# Patient Record
Sex: FEMALE | Race: WHITE | NOT HISPANIC OR LATINO | Employment: FULL TIME | URBAN - METROPOLITAN AREA
[De-identification: names, ages, dates, MRNs, and addresses within clinical notes are randomized per-mention and may not be internally consistent; named-entity substitution may affect disease eponyms.]

---

## 2017-01-11 ENCOUNTER — ALLSCRIPTS OFFICE VISIT (OUTPATIENT)
Dept: OTHER | Facility: OTHER | Age: 32
End: 2017-01-11

## 2017-01-11 ENCOUNTER — TRANSCRIBE ORDERS (OUTPATIENT)
Dept: ADMINISTRATIVE | Facility: HOSPITAL | Age: 32
End: 2017-01-11

## 2017-01-11 ENCOUNTER — GENERIC CONVERSION - ENCOUNTER (OUTPATIENT)
Dept: OTHER | Facility: OTHER | Age: 32
End: 2017-01-11

## 2017-01-11 ENCOUNTER — HOSPITAL ENCOUNTER (OUTPATIENT)
Dept: ULTRASOUND IMAGING | Facility: HOSPITAL | Age: 32
Discharge: HOME | End: 2017-01-11
Attending: OBSTETRICS & GYNECOLOGY
Payer: COMMERCIAL

## 2017-01-11 DIAGNOSIS — O20.9 UNSPECIFIED HEMORRHAGE IN EARLY PREGNANCY, DELIVERED: Primary | ICD-10-CM

## 2017-01-11 DIAGNOSIS — N93.8 OTHER SPECIFIED ABNORMAL UTERINE AND VAGINAL BLEEDING: ICD-10-CM

## 2017-01-11 PROCEDURE — 76801 OB US < 14 WKS SINGLE FETUS: CPT

## 2017-01-12 ENCOUNTER — ALLSCRIPTS OFFICE VISIT (OUTPATIENT)
Dept: OTHER | Facility: OTHER | Age: 32
End: 2017-01-12

## 2017-01-12 LAB
ABO GROUP BLD: NORMAL
BLD GP AB SCN SERPL QL: NEGATIVE
HCG, QUANTITATIVE (HISTORICAL): 3580 MIU/ML
RH BLD: POSITIVE

## 2017-01-16 ENCOUNTER — GENERIC CONVERSION - ENCOUNTER (OUTPATIENT)
Dept: OTHER | Facility: OTHER | Age: 32
End: 2017-01-16

## 2017-01-17 ENCOUNTER — HOSPITAL ENCOUNTER (OUTPATIENT)
Dept: ULTRASOUND IMAGING | Facility: HOSPITAL | Age: 32
Discharge: HOME | End: 2017-01-17
Attending: OBSTETRICS & GYNECOLOGY
Payer: COMMERCIAL

## 2017-01-17 DIAGNOSIS — Z36.9 ENCOUNTER FOR ANTENATAL SCREENING OF MOTHER: ICD-10-CM

## 2017-01-17 PROCEDURE — 76816 OB US FOLLOW-UP PER FETUS: CPT

## 2017-01-17 PROCEDURE — 76817 TRANSVAGINAL US OBSTETRIC: CPT

## 2017-05-04 ENCOUNTER — ALLSCRIPTS OFFICE VISIT (OUTPATIENT)
Dept: OTHER | Facility: OTHER | Age: 32
End: 2017-05-04

## 2017-05-04 ENCOUNTER — LAB REQUISITION (OUTPATIENT)
Dept: LAB | Facility: HOSPITAL | Age: 32
End: 2017-05-04
Payer: COMMERCIAL

## 2017-05-04 DIAGNOSIS — N89.8 OTHER SPECIFIED NONINFLAMMATORY DISORDER OF VAGINA: ICD-10-CM

## 2017-05-04 DIAGNOSIS — Z01.419 ENCOUNTER FOR GYNECOLOGICAL EXAMINATION WITHOUT ABNORMAL FINDING: ICD-10-CM

## 2017-05-04 DIAGNOSIS — Z11.51 ENCOUNTER FOR SCREENING FOR HUMAN PAPILLOMAVIRUS (HPV): ICD-10-CM

## 2017-05-04 PROCEDURE — 87070 CULTURE OTHR SPECIMN AEROBIC: CPT | Performed by: OBSTETRICS & GYNECOLOGY

## 2017-05-04 PROCEDURE — 87591 N.GONORRHOEAE DNA AMP PROB: CPT | Performed by: OBSTETRICS & GYNECOLOGY

## 2017-05-04 PROCEDURE — 87491 CHLMYD TRACH DNA AMP PROBE: CPT | Performed by: OBSTETRICS & GYNECOLOGY

## 2017-05-04 PROCEDURE — 87624 HPV HI-RISK TYP POOLED RSLT: CPT | Performed by: OBSTETRICS & GYNECOLOGY

## 2017-05-04 PROCEDURE — G0145 SCR C/V CYTO,THINLAYER,RESCR: HCPCS | Performed by: OBSTETRICS & GYNECOLOGY

## 2017-05-04 PROCEDURE — 87147 CULTURE TYPE IMMUNOLOGIC: CPT | Performed by: OBSTETRICS & GYNECOLOGY

## 2017-05-07 LAB
BACTERIA GENITAL AEROBE CULT: NORMAL
BACTERIA GENITAL AEROBE CULT: NORMAL

## 2017-05-10 LAB
CHLAMYDIA DNA CVX QL NAA+PROBE: NORMAL
N GONORRHOEA DNA GENITAL QL NAA+PROBE: NORMAL

## 2017-05-11 ENCOUNTER — GENERIC CONVERSION - ENCOUNTER (OUTPATIENT)
Dept: OTHER | Facility: OTHER | Age: 32
End: 2017-05-11

## 2017-05-12 LAB — HPV RRNA GENITAL QL NAA+PROBE: ABNORMAL

## 2017-05-17 LAB
LAB AP GYN PRIMARY INTERPRETATION: NORMAL
Lab: NORMAL
PATH INTERP SPEC-IMP: NORMAL

## 2017-06-07 ENCOUNTER — LAB REQUISITION (OUTPATIENT)
Dept: LAB | Facility: HOSPITAL | Age: 32
End: 2017-06-07
Payer: COMMERCIAL

## 2017-06-07 ENCOUNTER — LAB CONVERSION - ENCOUNTER (OUTPATIENT)
Dept: OTHER | Facility: OTHER | Age: 32
End: 2017-06-07

## 2017-06-07 ENCOUNTER — ALLSCRIPTS OFFICE VISIT (OUTPATIENT)
Dept: OTHER | Facility: OTHER | Age: 32
End: 2017-06-07

## 2017-06-07 DIAGNOSIS — N76.0 ACUTE VAGINITIS: ICD-10-CM

## 2017-06-07 DIAGNOSIS — R87.810 CERVICAL HIGH RISK HUMAN PAPILLOMAVIRUS (HPV) DNA TEST POSITIVE: ICD-10-CM

## 2017-06-07 LAB — HCG, QUALITATIVE (HISTORICAL): NEGATIVE

## 2017-06-07 PROCEDURE — 88305 TISSUE EXAM BY PATHOLOGIST: CPT | Performed by: OBSTETRICS & GYNECOLOGY

## 2017-06-07 PROCEDURE — 87624 HPV HI-RISK TYP POOLED RSLT: CPT | Performed by: OBSTETRICS & GYNECOLOGY

## 2017-06-07 PROCEDURE — 87070 CULTURE OTHR SPECIMN AEROBIC: CPT | Performed by: OBSTETRICS & GYNECOLOGY

## 2017-06-07 PROCEDURE — 87623 HPV LOW-RISK TYPES: CPT | Performed by: OBSTETRICS & GYNECOLOGY

## 2017-06-09 LAB — BACTERIA GENITAL AEROBE CULT: NORMAL

## 2017-06-10 LAB
HPV I/H RISK 1 DNA CVX QL PROBE+SIG AMP: NEGATIVE
HPV LOW RISK DNA CVX QL PROBE+SIG AMP: NEGATIVE

## 2017-11-07 ENCOUNTER — ALLSCRIPTS OFFICE VISIT (OUTPATIENT)
Dept: OTHER | Facility: OTHER | Age: 32
End: 2017-11-07

## 2017-11-07 LAB — HCG, QUALITATIVE (HISTORICAL): NEGATIVE

## 2017-11-07 PROCEDURE — 87186 SC STD MICRODIL/AGAR DIL: CPT | Performed by: OBSTETRICS & GYNECOLOGY

## 2017-11-07 PROCEDURE — 87070 CULTURE OTHR SPECIMN AEROBIC: CPT | Performed by: OBSTETRICS & GYNECOLOGY

## 2017-11-07 PROCEDURE — 87077 CULTURE AEROBIC IDENTIFY: CPT | Performed by: OBSTETRICS & GYNECOLOGY

## 2017-11-08 NOTE — PROGRESS NOTES
Assessment  1  Bacterial vaginitis (616 10,041 9) (N76 0,B96 89)    Plan  Contraceptives    · Follow-up PRN Evaluation and Treatment  Follow-up  Status: Complete  Done:  56MGV9394 03:13PM   Ordered; For: Contraceptives; Ordered By: Citlaly Sparks Performed:  Due: 46ZIX9121  DUB (dysfunctional uterine bleeding)    · Urine HCG- POC; Status:Resulted - Requires Verification,Retrospective By Protocol  Authorization;   Done: 29MYC0034 02:54PM   Performed: In Office; EEA:[id number]; Last Updated By:EN Olivo; 11/7/2017 2:55:33 PM;Ordered; Today; For:DUB (dysfunctional uterine bleeding); Ordered By:Nakul Robles;    Discussion/Summary  Discussion Summary:   Will heed culture and urinalysis; gave 4 samples of LoLoestrin  Chief Complaint  Chief Complaint Free Text Note Form: The patient presents to the office for std testing  History of Present Illness  HPI: here for cultures to be taken to detect possible bacterial vaginitis; also wants to try a birth control      Review of Systems  Focused-Female:   Constitutional: No fever, no chills, feels well, no tiredness, no recent weight gain or loss  ENT: no ear ache, no loss of hearing, no nosebleeds or nasal discharge, no sore throat or hoarseness  Cardiovascular: no complaints of slow or fast heart rate, no chest pain, no palpitations, no leg claudication or lower extremity edema  Respiratory: no complaints of shortness of breath, no wheezing, no dyspnea on exertion, no orthopnea or PND  Breasts: no complaints of breast pain, breast lump or nipple discharge  Gastrointestinal: no complaints of abdominal pain, no constipation, no nausea or diarrhea, no vomiting, no bloody stools  Genitourinary: no complaints of dysuria, no incontinence, no pelvic pain, no dysmenorrhea, no vaginal discharge or abnormal vaginal bleeding  Musculoskeletal: no complaints of arthralgia, no myalgia, no joint swelling or stiffness, no limb pain or swelling     Integumentary: no complaints of skin rash or lesion, no itching or dry skin, no skin wounds  Neurological: no complaints of headache, no confusion, no numbness or tingling, no dizziness or fainting  Active Problems  1  Abnormal cervical Papanicolaou smear (795 00) (R87 619)   2  Amenorrhea (626 0) (N91 2)   3  Bacterial vaginitis (616 10,041 9) (N76 0,B96 89)   4  Contraceptives (V25 02)   5  DUB (dysfunctional uterine bleeding) (626 8) (N93 8)   6  Encounter for biopsy (V72 83) (Z01 818)   7  Encounter for cervical Pap smear with pelvic exam (V76 2,V72 31) (Z01 419)   8  Encounter for gynecological examination (V72 31) (Z01 419)   9  Encounter for pregnancy test (V72 40) (Z32 00)   10  Encounter for routine gynecological examination with Papanicolaou smear of cervix    (V72 31,V76 2) (Z01 419)   11  Encounter for routine gynecological examination with Papanicolaou smear of cervix    (V72 31,V76 2) (Z01 419)   12  Encounter for ultrasound to determine fetal location (V28 3) (Z36 89)   13  First trimester bleeding (640 93) (O20 9)   14  GBS (group B streptococcus) infection (041 02) (A49 1)   15  General counselling and advice on contraception (V25 09) (Z30 09)   16  Pap smear of cervix shows high risk HPV present (795 05) (R87 810)   17  Routine Gynecological Exam With Cervical Pap Smear (V72 31)   18  Routine screening for STI (sexually transmitted infection) (V74 5) (Z11 3)   19  Screening for HPV (human papillomavirus) (V73 81) (Z11 51)   20  Vaginal discharge (623 5) (N89 8)   21  Vaginal itching (698 1) (L29 8)   22  Vaginal odor (625 8) (N89 8)    Surgical History  1  History of Appendectomy (47 0)   2  History of Breast Surgery Reduction Procedure   3  Contraceptives (V25 02)    Family History  Mother    1  Family history of High blood pressure (401 9) (I10)  Father    2  Family history of High blood pressure (401 9) (I10)  Maternal Grandfather    3   Family history of Diabetes (250 00) (E11 9)    Social History   · Being A Social Drinker   · Never A Smoker    Current Meds   1  KlonoPIN TABS; Therapy: (0664 313 06 34) to Recorded   2  Pantoprazole Sodium 40 MG Oral Tablet Delayed Release; Therapy: 64ZUB4103 to (Evaluate:11Jan2015) Recorded   3  PROzac CAPS; Therapy: (0664 313 06 34) to Recorded   4  Xanax TABS; Therapy: (Recorded:07Nov2017) to Recorded    Allergies  1  No Known Drug Allergies    Vitals  Vital Signs    Recorded: 51EWL1453 39:14CQ   Systolic 494   Diastolic 72   Height 5 ft 2 5 in   Weight 166 lb    BMI Calculated 29 88   BSA Calculated 1 78   LMP 22Oct2017     Physical Exam    Genitourinary   Vagina: Normal, no lesions or dryness appreciated  -- culture        Results/Data  Urine HCG- POC 69UCW7554 02:54PM [facility]     Test Name Result Flag Reference   Urine HCG Negative                     Signatures   Electronically signed by : Ede Ramirez DO; Nov 7 2017  3:15PM EST                       (Author)

## 2017-11-09 ENCOUNTER — LAB REQUISITION (OUTPATIENT)
Dept: LAB | Facility: HOSPITAL | Age: 32
End: 2017-11-09
Payer: COMMERCIAL

## 2017-11-09 DIAGNOSIS — Z11.3 ENCOUNTER FOR SCREENING FOR INFECTIONS WITH PREDOMINANTLY SEXUAL MODE OF TRANSMISSION: ICD-10-CM

## 2017-11-09 DIAGNOSIS — N89.8 OTHER SPECIFIED NONINFLAMMATORY DISORDERS OF VAGINA (CODE): ICD-10-CM

## 2017-11-09 PROCEDURE — 87591 N.GONORRHOEAE DNA AMP PROB: CPT | Performed by: OBSTETRICS & GYNECOLOGY

## 2017-11-09 PROCEDURE — 87491 CHLMYD TRACH DNA AMP PROBE: CPT | Performed by: OBSTETRICS & GYNECOLOGY

## 2017-11-10 LAB
CHLAMYDIA DNA CVX QL NAA+PROBE: NORMAL
N GONORRHOEA DNA GENITAL QL NAA+PROBE: NORMAL

## 2017-11-11 LAB — BACTERIA GENITAL AEROBE CULT: ABNORMAL

## 2017-11-14 ENCOUNTER — GENERIC CONVERSION - ENCOUNTER (OUTPATIENT)
Dept: OTHER | Facility: OTHER | Age: 32
End: 2017-11-14

## 2017-12-13 ENCOUNTER — HOSPITAL ENCOUNTER (EMERGENCY)
Facility: HOSPITAL | Age: 32
Discharge: HOME | End: 2017-12-13
Attending: EMERGENCY MEDICINE | Admitting: EMERGENCY MEDICINE
Payer: COMMERCIAL

## 2017-12-13 VITALS
RESPIRATION RATE: 18 BRPM | OXYGEN SATURATION: 98 % | SYSTOLIC BLOOD PRESSURE: 185 MMHG | DIASTOLIC BLOOD PRESSURE: 86 MMHG | HEART RATE: 93 BPM

## 2017-12-13 DIAGNOSIS — B37.3 VAGINAL CANDIDIASIS: Primary | ICD-10-CM

## 2017-12-13 LAB — EXT PREG TEST URINE: NEGATIVE

## 2017-12-13 PROCEDURE — 87491 CHLMYD TRACH DNA AMP PROBE: CPT | Performed by: EMERGENCY MEDICINE

## 2017-12-13 PROCEDURE — 99283 EMERGENCY DEPT VISIT LOW MDM: CPT

## 2017-12-13 PROCEDURE — 81025 URINE PREGNANCY TEST: CPT | Performed by: EMERGENCY MEDICINE

## 2017-12-13 PROCEDURE — 87591 N.GONORRHOEAE DNA AMP PROB: CPT | Performed by: EMERGENCY MEDICINE

## 2017-12-13 PROCEDURE — 96372 THER/PROPH/DIAG INJ SC/IM: CPT

## 2017-12-13 RX ORDER — AZITHROMYCIN 250 MG/1
1000 TABLET, FILM COATED ORAL ONCE
Status: COMPLETED | OUTPATIENT
Start: 2017-12-13 | End: 2017-12-13

## 2017-12-13 RX ORDER — FLUCONAZOLE 150 MG/1
150 TABLET ORAL ONCE
Qty: 1 TABLET | Refills: 0 | Status: SHIPPED | OUTPATIENT
Start: 2017-12-16 | End: 2017-12-16

## 2017-12-13 RX ORDER — FLUCONAZOLE 150 MG/1
150 TABLET ORAL ONCE
Status: COMPLETED | OUTPATIENT
Start: 2017-12-13 | End: 2017-12-13

## 2017-12-13 RX ADMIN — LIDOCAINE HYDROCHLORIDE 250 MG: 10 INJECTION, SOLUTION EPIDURAL; INFILTRATION; INTRACAUDAL; PERINEURAL at 18:47

## 2017-12-13 RX ADMIN — FLUCONAZOLE 150 MG: 150 TABLET ORAL at 18:45

## 2017-12-13 RX ADMIN — AZITHROMYCIN 1000 MG: 250 TABLET, FILM COATED ORAL at 18:46

## 2017-12-13 NOTE — DISCHARGE INSTRUCTIONS
Vulvovaginal Candidiasis   WHAT YOU NEED TO KNOW:   Vulvovaginal candidiasis, or yeast infection, is a common vaginal infection  Vulvovaginal candidiasis is caused by a fungus, or yeast-like germ  Fungi are normally found in your vagina  When there are too many fungi, it can cause an infection  DISCHARGE INSTRUCTIONS:   Return to the emergency department if:   · You have fever and chills  · You are bleeding from your vagina and it is not your monthly period  · You develop abdominal or pelvic pain  Contact your healthcare provider if:   · Your signs and symptoms get worse, even after treatment  · You have questions or concerns about your condition or care  Medicines:   · Medicines  help treat the fungal infection and decrease inflammation  The medicine may be a pill, topical cream, or vaginal suppository  · Take your medicine as directed  Contact your healthcare provider if you think your medicine is not helping or if you have side effects  Tell him of her if you are allergic to any medicine  Keep a list of the medicines, vitamins, and herbs you take  Include the amounts, and when and why you take them  Bring the list or the pill bottles to follow-up visits  Carry your medicine list with you in case of an emergency  Manage your symptoms:   · Wear cotton underwear  · Keep the vaginal area clean and dry  · Do not have sex until your symptoms are gone  · Do not douche  · Do not use feminine hygiene sprays, powders, or bubble bath  Prevent another infection:   · Take showers instead of baths  · Eat yogurt  · Limit the amount of alcohol you drink'    · Control your blood sugar if you are diabetic  · Limit your time in hot tubs  Follow up with your healthcare provider as directed:  Write down your questions so you remember to ask them during your visits     © 2017 [employer] Information is for End User's use only and may not be sold, redistributed or otherwise used for commercial purposes  All illustrations and images included in CareNotes® are the copyrighted property of [employer]  or Dyllan Ford  The above information is an  only  It is not intended as medical advice for individual conditions or treatments  Talk to your doctor, nurse or pharmacist before following any medical regimen to see if it is safe and effective for you  Safe Sex   WHAT YOU NEED TO KNOW:   Safe sex is a combination of practices you can do to prevent pregnancy and the spread of sexually transmitted infections (STIs)  These practices help to decrease or prevent the exchange of body fluids during sexual contact  Body fluids include saliva, urine, blood, vaginal fluids, and semen  All types of sex can cause STIs  This includes oral, vaginal, and anal sex  DISCHARGE INSTRUCTIONS:   Return to the emergency department if:   · A condom breaks, leaks, or slips off while you are having sex  · You notice sores on your penis, vagina, anal area, or skin around them  · You have had unsafe sex and want to discuss emergency contraception or treatment for STI exposure  Contact your healthcare provider if:   · You think you might be pregnant  · You have questions or concerns about your condition or care  How to practice safe sex:  Talk to your partner before you have sex  Ask about his or her sexual history and any current or past STI  · Use condoms and barrier methods for all types of sexual contact  Use a new condom or latex barrier each time you have sex  This includes oral, vaginal, and anal sex  Make sure that the condom fits and is put on correctly  Rubber latex sheets or dental dams can be used for oral sex  Ask your healthcare provider how to use these items and where to purchase them  If you are allergic to latex, use a nonlatex product such as polyurethane  · Limit your number of sexual partners  More than one sex partner can increase your risk for an STI  Do not have sex with anyone whose sexual history you do not know  · Do not do activities that can pass germs  Do not use saliva as a lubricant or share sex toys  · Tell your sex partner if you have an STI  Your partner may need to be tested and treated  Do not have sex while you are being treated for an STI, or with a partner who is being treated  · Get tested regularly for STIs  Get tested if you have had sexual contact with someone who has an STI  Get tested if you have unprotected sex with any new partner  · Get vaccinated  Vaccines may help to lower your risk for an STI such as HPV, hepatitis A, or hepatitis B  Ask your healthcare provider for more information on vaccines  Other ways to practice safe sex:   · Only use water-based lubricants during sex  Water-based lubricants may prevent sores or cuts in the vagina or penis  Prevent sores or cuts to decrease your risk for an STI  Do not use oil-based lubricants, such as baby oil or hand lotion, with latex condoms or barriers  These will weaken the latex and may cause it to break  · Do not use chemical irritants on condoms or genitals  Products that contain chemical irritants, such as spermicides, can irritate the lining of your vagina or rectum  Irritation may cause sores that may increase your risk for an STI  · Be careful when you have sex if you have open sores or cuts  Open sores or cuts may increase your risk for an STI  This includes new piercings and tattoos  Keep all open sores or cuts covered during sex  Do not have oral sex if you have cuts or sores in your mouth  Ask your healthcare provider when it is safe to have sex after you get a tattoo or piercing  · Do not use alcohol or drugs before sex  These substances can prevent you from thinking clearly and increase your risk for unsafe sex  Follow up with your healthcare provider as directed:  Write down your questions so you remember to ask them during your visits  © 2017 [facility] Information is for End User's use only and may not be sold, redistributed or otherwise used for commercial purposes  All illustrations and images included in CareNotes® are the copyrighted property of [employer] Inc  or Dyllan Ford  The above information is an  only  It is not intended as medical advice for individual conditions or treatments  Talk to your doctor, nurse or pharmacist before following any medical regimen to see if it is safe and effective for you

## 2017-12-13 NOTE — ED PROVIDER NOTES
History  Chief Complaint   Patient presents with    Vaginal Discharge     Pt having white discharge from vagina, vaginal itching/pain, fevers (TMAX 101 3 temporal) and abd pain, pt reports her  was cheating on her  Pt has been self treating with monistatx2 days without relief  Pt had miscarriage in january  Patient is a 20-year-old female  She presents to the emergency room for evaluation of a vaginal discharge  She initially thought it was used  She tried treating it with Monistat without relief  She does have associated lower abdominal pain  She reports a fever  No dysuria or frequency  No hematuria  No constipation or diarrhea  No nausea or vomiting  She recently left her  for him cheating on her  She is worried she might have been exposed to something  She has also been having protected sex with condoms with multiple partners  Symptoms are moderate in intensity  There are no relieving factors  None       Past Medical History:   Diagnosis Date    GERD (gastroesophageal reflux disease)     Miscarriage        Past Surgical History:   Procedure Laterality Date    ABDOMINAL SURGERY      GASTRIC RESTRICTION SURGERY         History reviewed  No pertinent family history  I have reviewed and agree with the history as documented  Social History   Substance Use Topics    Smoking status: Never Smoker    Smokeless tobacco: Never Used    Alcohol use No        Review of Systems   Constitutional: Positive for fever  Negative for chills  HENT: Negative for rhinorrhea and sore throat  Eyes: Negative for pain, redness and visual disturbance  Respiratory: Negative for cough and shortness of breath  Cardiovascular: Negative for chest pain and leg swelling  Gastrointestinal: Positive for abdominal pain  Negative for diarrhea and vomiting  Endocrine: Negative for polydipsia and polyuria  Genitourinary: Positive for vaginal discharge   Negative for dysuria, frequency, hematuria and vaginal bleeding  Musculoskeletal: Negative for back pain and neck pain  Skin: Negative for rash and wound  Allergic/Immunologic: Negative for immunocompromised state  Neurological: Negative for weakness, numbness and headaches  Hematological: Does not bruise/bleed easily  Psychiatric/Behavioral: Negative for hallucinations and suicidal ideas  All other systems reviewed and are negative  Physical Exam  ED Triage Vitals [12/13/17 1536]   Temp Pulse Respirations Blood Pressure SpO2   -- 61 18 (!) 193/80 100 %      Temp src Heart Rate Source Patient Position - Orthostatic VS BP Location FiO2 (%)   -- Monitor Sitting Left arm --      Pain Score       6           Orthostatic Vital Signs  Vitals:    12/13/17 1536 12/13/17 1754   BP: (!) 193/80 (!) 185/86   Pulse: 61 93   Patient Position - Orthostatic VS: Sitting Sitting       Physical Exam   Constitutional: She is oriented to person, place, and time  She appears well-developed and well-nourished  HENT:   Head: Normocephalic and atraumatic  Mouth/Throat: Oropharynx is clear and moist    Eyes: Conjunctivae are normal  Right eye exhibits no discharge  Left eye exhibits no discharge  No scleral icterus  Neck: Normal range of motion  Neck supple  Cardiovascular: Normal rate, regular rhythm, normal heart sounds and intact distal pulses  Exam reveals no gallop and no friction rub  No murmur heard  Pulmonary/Chest: Effort normal and breath sounds normal  No stridor  No respiratory distress  She has no wheezes  She has no rales  Abdominal: Soft  Bowel sounds are normal  She exhibits no distension  There is no tenderness  There is no rebound and no guarding  Genitourinary:   Genitourinary Comments: There are no vaginal lesions  There is a clumpy white vaginal discharge  There is no cervical motion tenderness  No adnexal tenderness  Musculoskeletal: Normal range of motion  She exhibits no edema, tenderness or deformity     No CVA tenderness  No calf tenderness/palpable cords  Neurological: She is alert and oriented to person, place, and time  She has normal strength  No sensory deficit  GCS eye subscore is 4  GCS verbal subscore is 5  GCS motor subscore is 6  Skin: Skin is warm and dry  No rash noted  Psychiatric: She has a normal mood and affect  Her behavior is normal    Vitals reviewed  ED Medications  Medications   cefTRIAXone (ROCEPHIN) 250 mg in lidocaine (PF) (XYLOCAINE-MPF) 1 % IM only syringe (not administered)   azithromycin (ZITHROMAX) tablet 1,000 mg (not administered)   fluconazole (DIFLUCAN) tablet 150 mg (not administered)       Diagnostic Studies  Results Reviewed     Procedure Component Value Units Date/Time    POCT pregnancy, urine [[id number]]  (Normal) Resulted:  12/13/17 1759    Lab Status:  Final result Updated:  12/13/17 1759     EXT PREG TEST UR (Ref: Negative) Negative                 No orders to display              Procedures  Procedures       Phone Contacts  ED Phone Contact    ED Course  ED Course                                MDM  Number of Diagnoses or Management Options  Diagnosis management comments: Most likely this is candidiasis  PID is less likely  Amount and/or Complexity of Data Reviewed  Clinical lab tests: ordered and reviewed      CritCare Time    Disposition  Final diagnoses:   Vaginal candidiasis     Time reflects when diagnosis was documented in both MDM as applicable and the Disposition within this note     Time User Action Codes Description Comment    12/13/2017  6:28 PM Mario Rondon Add [B37 3] Vaginal candidiasis       ED Disposition     ED Disposition Condition Comment    Discharge  [address] discharge to home/self care  Condition at discharge: Good        Follow-up Information     Follow up With Specialties Details Why Contact Info       Follow-up with your OBGYN if not better in 1 week           Patient's Medications   Discharge Prescriptions FLUCONAZOLE (DIFLUCAN) 150 MG TABLET    Take 1 tablet by mouth once for 1 dose       Start Date: 12/16/2017End Date: 12/16/2017       Order Dose: 150 mg       Quantity: 1 tablet    Refills: 0     No discharge procedures on file      ED Provider  Electronically Signed by           Mine Pitts MD  12/13/17 8693

## 2017-12-14 LAB
CHLAMYDIA DNA CVX QL NAA+PROBE: NORMAL
N GONORRHOEA DNA GENITAL QL NAA+PROBE: NORMAL

## 2018-01-10 NOTE — CONSULTS
Chief Complaint  The patient presents to the office for std testing  History of Present Illness  here for cultures to be taken to detect possible bacterial vaginitis; also wants to try a birth control      Review of Systems    Constitutional: No fever, no chills, feels well, no tiredness, no recent weight gain or loss  ENT: no ear ache, no loss of hearing, no nosebleeds or nasal discharge, no sore throat or hoarseness  Cardiovascular: no complaints of slow or fast heart rate, no chest pain, no palpitations, no leg claudication or lower extremity edema  Respiratory: no complaints of shortness of breath, no wheezing, no dyspnea on exertion, no orthopnea or PND  Breasts: no complaints of breast pain, breast lump or nipple discharge  Gastrointestinal: no complaints of abdominal pain, no constipation, no nausea or diarrhea, no vomiting, no bloody stools  Genitourinary: no complaints of dysuria, no incontinence, no pelvic pain, no dysmenorrhea, no vaginal discharge or abnormal vaginal bleeding  Musculoskeletal: no complaints of arthralgia, no myalgia, no joint swelling or stiffness, no limb pain or swelling  Integumentary: no complaints of skin rash or lesion, no itching or dry skin, no skin wounds  Neurological: no complaints of headache, no confusion, no numbness or tingling, no dizziness or fainting  Active Problems    1  Abnormal cervical Papanicolaou smear (795 00) (R87 619)   2  Amenorrhea (626 0) (N91 2)   3  Bacterial vaginitis (616 10,041 9) (N76 0,B96 89)   4  Contraceptives (V25 02)   5  DUB (dysfunctional uterine bleeding) (626 8) (N93 8)   6  Encounter for biopsy (V72 83) (Z01 818)   7  Encounter for cervical Pap smear with pelvic exam (V76 2,V72 31) (Z01 419)   8  Encounter for gynecological examination (V72 31) (Z01 419)   9  Encounter for pregnancy test (V72 40) (Z32 00)   10   Encounter for routine gynecological examination with Papanicolaou smear of cervix    (V72 31,V76 2) (Z01 419)   11  Encounter for routine gynecological examination with Papanicolaou smear of cervix    (V72 31,V76 2) (Z01 419)   12  Encounter for ultrasound to determine fetal location (V28 3) (Z36 89)   13  First trimester bleeding (640 93) (O20 9)   14  GBS (group B streptococcus) infection (041 02) (A49 1)   15  General counselling and advice on contraception (V25 09) (Z30 09)   16  Pap smear of cervix shows high risk HPV present (795 05) (R87 810)   17  Routine Gynecological Exam With Cervical Pap Smear (V72 31)   18  Routine screening for STI (sexually transmitted infection) (V74 5) (Z11 3)   19  Screening for HPV (human papillomavirus) (V73 81) (Z11 51)   20  Vaginal discharge (623 5) (N89 8)   21  Vaginal itching (698 1) (L29 8)   22  Vaginal odor (625 8) (N89 8)    Surgical History    · History of Appendectomy (47 0)   · History of Breast Surgery Reduction Procedure   · Contraceptives (V25 02)    Family History    · Family history of High blood pressure (401 9) (I10)    · Family history of High blood pressure (401 9) (I10)    · Family history of Diabetes (250 00) (E11 9)    Social History    · Being A Social Drinker   · Never A Smoker    Current Meds   1  KlonoPIN TABS; Therapy: (0664 313 06 34) to Recorded   2  Pantoprazole Sodium 40 MG Oral Tablet Delayed Release; Therapy: 14JDW6051 to (Evaluate:11Jan2015) Recorded   3  PROzac CAPS; Therapy: (0664 313 06 34) to Recorded   4  Xanax TABS; Therapy: (Recorded:07Nov2017) to Recorded    Allergies    1  No Known Drug Allergies    Vitals   Recorded: 21WFB9212 49:46OD   Systolic 997   Diastolic 72   Height 5 ft 2 5 in   Weight 166 lb    BMI Calculated 29 88   BSA Calculated 1 78   LMP 22Oct2017     Physical Exam    Genitourinary   Vagina: Normal, no lesions or dryness appreciated  culture  Results/Data  Urine HCG- POC 29RAE4789 02:54PM [facility]     Test Name Result Flag Reference   Urine HCG Negative                     Assessment    1  Bacterial vaginitis (616 10,041 9) (N76 0,B96 89)    Plan  Contraceptives    · Follow-up PRN Evaluation and Treatment  Follow-up  Status: Complete  Done:  09XEN1559 03:13PM   Ordered; For: Contraceptives; Ordered By: Catherine Wells Performed:  Due: 83QUJ0924  DUB (dysfunctional uterine bleeding)    · Urine HCG- POC; Status:Resulted - Requires Verification,Retrospective By Protocol  Authorization;   Done: 42KJU9728 02:54PM   Performed: In Office; ELEUTERIO:[id number]; Last Updated By:Francisco Olivo; 11/7/2017 2:55:33 PM;Ordered; Today; For:DUB (dysfunctional uterine bleeding); Ordered By:Nakul Robles;    Discussion/Summary    Will heed culture and urinalysis; gave 4 samples of LoLoestrin        Signatures   Electronically signed by : Navdeep Singh DO; Nov 7 2017  3:15PM EST                       (Author)

## 2018-01-10 NOTE — PROGRESS NOTES
Chief Complaint  pt presents for hcg draw      Active Problems    1  Amenorrhea (626 0) (N91 2)   2  Contraceptives (V25 02)   3  Encounter for pregnancy test (V72 40) (Z32 00)   4  Encounter for routine gynecological examination with Papanicolaou smear of cervix   (V72 31,V76 2) (Z01 419)   5  Encounter for routine gynecological examination with Papanicolaou smear of cervix   (V72 31,V76 2) (Z01 419)   6  General counselling and advice on contraception (V25 09) (Z30 09)   7  Routine Gynecological Exam With Cervical Pap Smear (V72 31)   8  Routine screening for STI (sexually transmitted infection) (V74 5) (Z11 3)    Current Meds   1  ALPRAZolam 0 25 MG Oral Tablet; Therapy: 00Vgd5911 to (Evaluate:02Nov2014) Recorded   2  Flonase 50 MCG/ACT SUSP; USE AS DIRECTED Recorded   3  Lexapro TABS; Take 1 tablet daily Recorded   4  NuvaRing 0 12-0 015 MG/24HR Vaginal Ring; INSERT 1 RING VAGINALLY EVERY 3   Weeks; Therapy: 94APS1984 to (Evaluate:16Nov2016)  Requested for: 87UOD4446; Last   Rx:76Uic1443 Ordered   5  NuvaRing 0 12-0 015 MG/24HR Vaginal Ring; USE AS DIRECTED Recorded   6  Pantoprazole Sodium 40 MG Oral Tablet Delayed Release; Therapy: 69HWJ1588 to (Evaluate:11Jan2015) Recorded   7  Singulair 10 MG Oral Tablet; Therapy: 88EUD4176 to (Last KA:11RCN6792)  Requested for: 08JKS4759 Ordered   8  Spironolactone TABS; Take 1 tablet twice daily Recorded    Allergies    1  No Known Drug Allergies    Plan  Amenorrhea    · (1) HCG QUANT; Status:Active - Retrospective By Protocol Authorization;  Requested  [facility]:[id number];     Future Appointments    Date/Time Provider Specialty Site   12/19/2016 04:10 PM Wellington Lanes, DO [facility]stetrics[facility]necology [facility] [address]     Signatures   Electronically signed by : Zohra Sequeira DO; Oct 25 2016  5:43PM EST                       (Author)

## 2018-01-10 NOTE — MISCELLANEOUS
Message  Return to work or school:   David Baldwin is under my professional care  She was seen in my office on 1/11/2017   She is able to return to work on  1/12/2017            Signatures   Electronically signed by :  JOSE LUIS Akins ; Jan 11 2017  4:26PM EST                       (Author)

## 2018-01-11 NOTE — PROGRESS NOTES
Chief Complaint  pt presents for a repeat HCG/ T&S      Active Problems    1  Amenorrhea (626 0) (N91 2)   2  Contraceptives (V25 02)   3  DUB (dysfunctional uterine bleeding) (626 8) (N93 8)   4  Encounter for gynecological examination (V72 31) (Z01 419)   5  Encounter for pregnancy test (V72 40) (Z32 00)   6  Encounter for routine gynecological examination with Papanicolaou smear of cervix   (V72 31,V76 2) (Z01 419)   7  Encounter for routine gynecological examination with Papanicolaou smear of cervix   (V72 31,V76 2) (Z01 419)   8  First trimester bleeding (640 93) (O20 9)   9  General counselling and advice on contraception (V25 09) (Z30 09)   10  Routine Gynecological Exam With Cervical Pap Smear (V72 31)   11  Routine screening for STI (sexually transmitted infection) (V74 5) (Z11 3)    Current Meds   1  ClomiPHENE Citrate 50 MG Oral Tablet; One tablet daily day five through day nine; Therapy: 60Ozf2656 to (Evaluate:10Jan2017)  Requested for: 78Ird3760; Last   Rx:[id number] Ordered   2  Flonase 50 MCG/ACT SUSP; USE AS DIRECTED Recorded   3  Lexapro TABS; Take 1 tablet daily Recorded   4  MedroxyPROGESTERone Acetate 10 MG Oral Tablet; One tablet daily day 16 to 25 of   cycle; Therapy: 07Puq7130 to (Evaluate:30Jan2017)  Requested for: 31Nwz3198; Last   Rx:[id number] Ordered   5  Pantoprazole Sodium 40 MG Oral Tablet Delayed Release; Therapy: 03JSU7172 to (Evaluate:11Jan2015) Recorded    Allergies    1  No Known Drug Allergies    Plan  First trimester bleeding    · (1) HCG QUANT; Status:Active - Retrospective By Protocol Authorization; Requested  VVW:[id number];    · (1) TYPE & SCREEN; Status:Active - Retrospective By Protocol Authorization;  Requested  WET:[id number];   the patient pregnant or have they been in the last 90 days? : Yes  the patient been transfused in the last 3 months? : No  number of units for surgical date : 0  of Surgery : [id number]    Future Appointments    Date/Time Provider Specialty Site   04/17/2017 03:30 PM Sammi Becerra DO Obstetrics/Gynecology [facility]     Signatures   Electronically signed by :  JOSE LUIS Akins ; Jan 11 2017  4:27PM EST                       (Author)

## 2018-01-11 NOTE — MISCELLANEOUS
Message  Message Free Text Note Form: I called and spoke with pt  about culture and sent Rx to [facility] in [place] for ciprofloxacin      Plan    1   Ciprofloxacin HCl - 500 MG Oral Tablet; TAKE 1 TABLET TWICE DAILY    Signatures   Electronically signed by : Wagner Cid DO; Nov 14 2017  6:50PM EST                       (Author)

## 2018-01-12 NOTE — MISCELLANEOUS
Message  Message Free Text Note Form: called and spoke to pt  about tissue report and bacterial, viral cultures      Signatures   Electronically signed by : Leata Lennox, DO; Jun 13 2017  5:16PM EST                       (Author)

## 2018-01-13 VITALS
DIASTOLIC BLOOD PRESSURE: 72 MMHG | WEIGHT: 166 LBS | SYSTOLIC BLOOD PRESSURE: 122 MMHG | HEIGHT: 63 IN | BODY MASS INDEX: 29.41 KG/M2

## 2018-01-13 VITALS
WEIGHT: 156.5 LBS | SYSTOLIC BLOOD PRESSURE: 120 MMHG | HEIGHT: 63 IN | BODY MASS INDEX: 27.73 KG/M2 | DIASTOLIC BLOOD PRESSURE: 70 MMHG

## 2018-01-13 VITALS — BODY MASS INDEX: 28.53 KG/M2 | DIASTOLIC BLOOD PRESSURE: 70 MMHG | SYSTOLIC BLOOD PRESSURE: 128 MMHG | WEIGHT: 158.5 LBS

## 2018-01-13 NOTE — MISCELLANEOUS
Message  Message Free Text Note Form: left message about recent cultures      Signatures   Electronically signed by : Aria Gardiner DO; May 11 2017  1:07PM EST                       (Author)

## 2018-01-16 NOTE — MISCELLANEOUS
Message  Message Free Text Note Form: I called and left message about neg   HCG results      Signatures   Electronically signed by : Mary Krishnan DO; Oct 27 2016  5:51PM EST                       (Author)

## 2018-01-17 NOTE — MISCELLANEOUS
Message  Message Free Text Note Form: spoke with pt  after her request to review labs of 01/04/2017; rec: with visit for blood work, to see nurse educator and make 1st doctor apt  with Dr Thee Last or Dr Ashlee Ortiz where they may be able to do u s  since quantitative value is > 3000; (note: I was part of Tonya's high school project as her health mentor; she, her sister, and Mother all patients, but her sister moved to [place] and just had her first baby prior to Izabella, 2016)      Signatures   Electronically signed by : Lauro Weeks DO; Rubio 10 2017  6:01PM EST                       (Author)

## 2018-01-17 NOTE — MISCELLANEOUS
Message  Message Free Text Note Form: I called and spoke with pt  about today's f/u pelvic u s ; I advised her to save anything that she may notice that passes in a pas or the toilet and bring it here for confirmation ;l will need f/u apt  here; blood type is B Positive; the report did not become available until late this afternoon; I offered our condolences and specifically mentioned that compared to 01/11/2017 , NO heartbeat today and even the measurement of the u s  is less      Signatures   Electronically signed by : Edvin Barrett DO; Jan 17 2017  5:26PM EST                       (Author)

## 2018-03-22 ENCOUNTER — TELEPHONE (OUTPATIENT)
Dept: OBGYN CLINIC | Facility: MEDICAL CENTER | Age: 33
End: 2018-03-22

## 2018-03-22 NOTE — TELEPHONE ENCOUNTER
The patient called in stating that she was having issues with an odor and thick white discharge and some itching and burning as well  I spoke with Dr James Garcia and he said to call in a prescription for Fluconazole 200 mg #2 she takes one pill today and then the next pill in two days with 1 refill  She is to call if this does not work to make an appt  I then relayed this to the patient and she was ok with this

## 2018-11-21 ENCOUNTER — OFFICE VISIT (OUTPATIENT)
Dept: OBGYN CLINIC | Facility: MEDICAL CENTER | Age: 33
End: 2018-11-21
Payer: COMMERCIAL

## 2018-11-21 VITALS
SYSTOLIC BLOOD PRESSURE: 116 MMHG | HEIGHT: 62 IN | WEIGHT: 159 LBS | DIASTOLIC BLOOD PRESSURE: 76 MMHG | BODY MASS INDEX: 29.26 KG/M2

## 2018-11-21 DIAGNOSIS — N89.8 VAGINAL IRRITATION: Primary | ICD-10-CM

## 2018-11-21 DIAGNOSIS — R10.2 PELVIC PAIN IN FEMALE: ICD-10-CM

## 2018-11-21 PROBLEM — R87.619 ABNORMAL CERVICAL PAPANICOLAOU SMEAR: Status: ACTIVE | Noted: 2017-06-07

## 2018-11-21 PROBLEM — O20.9 FIRST TRIMESTER BLEEDING: Status: ACTIVE | Noted: 2017-01-11

## 2018-11-21 PROBLEM — A49.1 GBS (GROUP B STREPTOCOCCUS) INFECTION: Status: ACTIVE | Noted: 2017-05-08

## 2018-11-21 PROBLEM — R87.810 PAP SMEAR OF CERVIX SHOWS HIGH RISK HPV PRESENT: Status: ACTIVE | Noted: 2017-06-07

## 2018-11-21 PROCEDURE — 99214 OFFICE O/P EST MOD 30 MIN: CPT | Performed by: OBSTETRICS & GYNECOLOGY

## 2018-11-21 PROCEDURE — 87070 CULTURE OTHR SPECIMN AEROBIC: CPT | Performed by: OBSTETRICS & GYNECOLOGY

## 2018-11-21 NOTE — PROGRESS NOTES
Assessment:   Anup Mendoza was seen today for vaginitis  Diagnoses and all orders for this visit:    Vaginal irritation  -     Genital Comprehensive Culture    Pelvic pain in female  -     US pelvis complete w transvaginal; Future    Discussion/Summary:  Here for recheck of bacterial vaginitis and also she was concerned about lower pelvic pain; vaginal culture obtained, wet mount was negative; time spent was  At least 25 minutes with greater than 50 % counseling; will heed u s  And have pt  RTO to weeks to discuss results and she may want to resume a contraceptive  Plan:  Heed culture and pelvic u s   1   Wet prep was obtained  Cultures for gonorrhea and chlamydia were collected  Affirm was not obtained  2   Will contact pt with results  3  Patient was not treated   CHIEF COMPLAINT: vaginal discharge and irritation and lower pelvic pain    Subjective:   Jitendra Cunha is a 29 y o  yo female who presents for evaluation and reculture after being treated for bacterial vaginosis  Her symptoms started 1 month ago  Alleviating factors: metronidazole  Aggravating factors: coitus    ROS:   She denies hematuria, dysuria, constipation, diarrhea, fevers, chills, nausea or emesis  Patient Active Problem List   Diagnosis    Abnormal cervical Papanicolaou smear    Amenorrhea    Depressive disorder    DUB (dysfunctional uterine bleeding)    First trimester bleeding    GBS (group B streptococcus) infection    Pap smear of cervix shows high risk HPV present       Past Medical History:   Diagnosis Date    GERD (gastroesophageal reflux disease)     Miscarriage        Social History     Social History    Marital status: Legally      Spouse name: N/A    Number of children: N/A    Years of education: N/A     Occupational History    Not on file       Social History Main Topics    Smoking status: Never Smoker    Smokeless tobacco: Never Used    Alcohol use No    Drug use: No    Sexual activity: Yes     Birth control/ protection: Condom     Other Topics Concern    Not on file     Social History Narrative    No narrative on file       No current outpatient prescriptions on file  Allergies   Allergen Reactions    Latex Facial Swelling       /76   Ht 5' 2" (1 575 m)   Wt 72 1 kg (159 lb)   LMP 11/11/2018   Breastfeeding? No   BMI 29 08 kg/m²     GEN: The patient was alert and oriented x3, pleasant well-appearing female in no acute distress  Pelvic: Normal appearing external female genitalia, normal vaginal epithelium, normalappearing cervix  positive discharge noted  Wet Prep: Clue cells negative, Hyphae negative, Trichomonas negative  Whiff Test was performed   which was negative

## 2018-11-23 LAB — BACTERIA GENITAL AEROBE CULT: NORMAL

## 2018-11-24 ENCOUNTER — TELEPHONE (OUTPATIENT)
Dept: OBGYN CLINIC | Facility: MEDICAL CENTER | Age: 33
End: 2018-11-24

## 2019-11-27 ENCOUNTER — CLINICAL SUPPORT (OUTPATIENT)
Dept: OBGYN CLINIC | Facility: MEDICAL CENTER | Age: 34
End: 2019-11-27
Payer: COMMERCIAL

## 2019-11-27 DIAGNOSIS — Z32.01 POSITIVE URINE PREGNANCY TEST: Primary | ICD-10-CM

## 2019-11-27 LAB
ABO GROUP BLD: NORMAL
B-HCG SERPL-ACNC: 262 MIU/ML
BLD GP AB SCN SERPL QL: NEGATIVE
PROGEST SERPL-MCNC: 13.6 NG/ML
RH BLD: POSITIVE
SPECIMEN EXPIRATION DATE: NORMAL

## 2019-11-27 PROCEDURE — 86900 BLOOD TYPING SEROLOGIC ABO: CPT | Performed by: OBSTETRICS & GYNECOLOGY

## 2019-11-27 PROCEDURE — 84702 CHORIONIC GONADOTROPIN TEST: CPT | Performed by: OBSTETRICS & GYNECOLOGY

## 2019-11-27 PROCEDURE — 86901 BLOOD TYPING SEROLOGIC RH(D): CPT | Performed by: OBSTETRICS & GYNECOLOGY

## 2019-11-27 PROCEDURE — 36415 COLL VENOUS BLD VENIPUNCTURE: CPT | Performed by: OBSTETRICS & GYNECOLOGY

## 2019-11-27 PROCEDURE — 84144 ASSAY OF PROGESTERONE: CPT | Performed by: OBSTETRICS & GYNECOLOGY

## 2019-11-27 PROCEDURE — 86850 RBC ANTIBODY SCREEN: CPT | Performed by: OBSTETRICS & GYNECOLOGY

## 2019-12-02 ENCOUNTER — TELEPHONE (OUTPATIENT)
Dept: OBGYN CLINIC | Facility: MEDICAL CENTER | Age: 34
End: 2019-12-02

## 2019-12-02 DIAGNOSIS — Z32.01 POSITIVE URINE PREGNANCY TEST: Primary | ICD-10-CM

## 2019-12-04 ENCOUNTER — APPOINTMENT (OUTPATIENT)
Dept: LAB | Facility: MEDICAL CENTER | Age: 34
End: 2019-12-04
Payer: COMMERCIAL

## 2019-12-04 DIAGNOSIS — Z32.01 POSITIVE URINE PREGNANCY TEST: ICD-10-CM

## 2019-12-04 LAB — B-HCG SERPL-ACNC: 2834 MIU/ML

## 2019-12-04 PROCEDURE — 84702 CHORIONIC GONADOTROPIN TEST: CPT

## 2019-12-04 PROCEDURE — 36415 COLL VENOUS BLD VENIPUNCTURE: CPT

## 2019-12-06 ENCOUNTER — APPOINTMENT (OUTPATIENT)
Dept: LAB | Facility: MEDICAL CENTER | Age: 34
End: 2019-12-06
Payer: COMMERCIAL

## 2019-12-06 DIAGNOSIS — Z32.01 POSITIVE URINE PREGNANCY TEST: Primary | ICD-10-CM

## 2019-12-06 DIAGNOSIS — Z32.01 POSITIVE URINE PREGNANCY TEST: ICD-10-CM

## 2019-12-06 LAB
B-HCG SERPL-ACNC: 4302 MIU/ML
PROGEST SERPL-MCNC: 11.5 NG/ML

## 2019-12-06 PROCEDURE — 84144 ASSAY OF PROGESTERONE: CPT

## 2019-12-06 PROCEDURE — 84702 CHORIONIC GONADOTROPIN TEST: CPT

## 2019-12-06 PROCEDURE — 36415 COLL VENOUS BLD VENIPUNCTURE: CPT

## 2019-12-09 DIAGNOSIS — Z32.01 POSITIVE BLOOD PREGNANCY TEST: Primary | ICD-10-CM

## 2019-12-11 ENCOUNTER — OFFICE VISIT (OUTPATIENT)
Dept: OBGYN CLINIC | Facility: MEDICAL CENTER | Age: 34
End: 2019-12-11
Payer: COMMERCIAL

## 2019-12-11 VITALS — SYSTOLIC BLOOD PRESSURE: 132 MMHG | DIASTOLIC BLOOD PRESSURE: 78 MMHG | BODY MASS INDEX: 31.46 KG/M2 | WEIGHT: 172 LBS

## 2019-12-11 DIAGNOSIS — N76.0 BV (BACTERIAL VAGINOSIS): Primary | ICD-10-CM

## 2019-12-11 DIAGNOSIS — B96.89 BV (BACTERIAL VAGINOSIS): Primary | ICD-10-CM

## 2019-12-11 LAB — SL AMB POCT WET MOUNT: NEGATIVE

## 2019-12-11 PROCEDURE — 99202 OFFICE O/P NEW SF 15 MIN: CPT | Performed by: NURSE PRACTITIONER

## 2019-12-11 PROCEDURE — 87210 SMEAR WET MOUNT SALINE/INK: CPT | Performed by: NURSE PRACTITIONER

## 2019-12-11 RX ORDER — METRONIDAZOLE 500 MG/1
500 TABLET ORAL EVERY 12 HOURS SCHEDULED
Qty: 14 TABLET | Refills: 0 | Status: SHIPPED | OUTPATIENT
Start: 2019-12-11 | End: 2019-12-18

## 2019-12-11 NOTE — PROGRESS NOTES
A/P:  36 y o  yo female with:  Diagnoses and all orders for this visit:    BV (bacterial vaginosis)  -     metroNIDAZOLE (FLAGYL) 500 mg tablet; Take 1 tablet (500 mg total) by mouth every 12 (twelve) hours for 7 days    Other orders  -     Prenatal Multivit-Min-Fe-FA (PRENATAL VITAMINS PO); Take by mouth daily  -     FOLIC ACID PO; Take 3,442 mg by mouth daily        1  Wet prep was obtained  Cultures for gonorrhea and chlamydia were not collected  Affirm was not obtained  2   Will contact pt with results  3  Patient was treated with metronidazole  HISTORY OF PRESENT ILLNESS:  Ms Tonya Aldrich is a 36 y o  yo No obstetric history on file  female who presents for vaginal discharge  She describes the discharge as thick and fishy odor  Her symptoms started 1 weeks ago  show no change    Alleviating factors: none  Aggravating factors: sex    ROS:   She denies hematuria, dysuria, constipation, diarrhea, fever, chills, nausea or emesis      Past Medical History:   Diagnosis Date    GERD (gastroesophageal reflux disease)     Miscarriage        Past Medical History:   Diagnosis Date    GERD (gastroesophageal reflux disease)     Miscarriage        Social History     Socioeconomic History    Marital status:      Spouse name: Not on file    Number of children: Not on file    Years of education: Not on file    Highest education level: Not on file   Occupational History    Not on file   Social Needs    Financial resource strain: Not on file    Food insecurity:     Worry: Not on file     Inability: Not on file    Transportation needs:     Medical: Not on file     Non-medical: Not on file   Tobacco Use    Smoking status: Never Smoker    Smokeless tobacco: Never Used   Substance and Sexual Activity    Alcohol use: No    Drug use: No    Sexual activity: Yes     Birth control/protection: Condom, None   Lifestyle    Physical activity:     Days per week: Not on file     Minutes per session: Not on file    Stress: Not on file   Relationships    Social connections:     Talks on phone: Not on file     Gets together: Not on file     Attends Anabaptism service: Not on file     Active member of club or organization: Not on file     Attends meetings of clubs or organizations: Not on file     Relationship status: Not on file    Intimate partner violence:     Fear of current or ex partner: Not on file     Emotionally abused: Not on file     Physically abused: Not on file     Forced sexual activity: Not on file   Other Topics Concern    Not on file   Social History Narrative    Not on file       /78   Wt 78 kg (172 lb)   LMP 10/26/2019   BMI 31 46 kg/m²     GEN: The patient was alert and oriented x3, pleasant well-appearing female in no acute distress  Pelvic: Normal appearing external female genitalia, normal vaginal epithelium, normalappearing cervix  positive discharge noted        Wet Prep: no pathogens

## 2019-12-18 ENCOUNTER — HOSPITAL ENCOUNTER (OUTPATIENT)
Dept: ULTRASOUND IMAGING | Facility: HOSPITAL | Age: 34
Discharge: HOME | End: 2019-12-18
Attending: OBSTETRICS & GYNECOLOGY
Payer: COMMERCIAL

## 2019-12-18 DIAGNOSIS — Z32.01 POSITIVE BLOOD PREGNANCY TEST: ICD-10-CM

## 2019-12-18 PROCEDURE — 76801 OB US < 14 WKS SINGLE FETUS: CPT

## 2019-12-31 ENCOUNTER — INITIAL PRENATAL (OUTPATIENT)
Dept: OBGYN CLINIC | Facility: MEDICAL CENTER | Age: 34
End: 2019-12-31

## 2019-12-31 ENCOUNTER — APPOINTMENT (OUTPATIENT)
Dept: LAB | Facility: MEDICAL CENTER | Age: 34
End: 2019-12-31
Payer: COMMERCIAL

## 2019-12-31 DIAGNOSIS — Z34.91 ENCOUNTER FOR PREGNANCY RELATED EXAMINATION, FIRST TRIMESTER: ICD-10-CM

## 2019-12-31 DIAGNOSIS — Z98.84 HISTORY OF ADJUSTABLE GASTRIC BANDING: ICD-10-CM

## 2019-12-31 DIAGNOSIS — Z34.91 ENCOUNTER FOR PREGNANCY RELATED EXAMINATION, FIRST TRIMESTER: Primary | ICD-10-CM

## 2019-12-31 LAB
25(OH)D3 SERPL-MCNC: 18.4 NG/ML (ref 30–100)
ABO GROUP BLD: NORMAL
ALBUMIN SERPL BCP-MCNC: 3.9 G/DL (ref 3.5–5)
ALP SERPL-CCNC: 53 U/L (ref 46–116)
ALT SERPL W P-5'-P-CCNC: 28 U/L (ref 12–78)
ANION GAP SERPL CALCULATED.3IONS-SCNC: 5 MMOL/L (ref 4–13)
AST SERPL W P-5'-P-CCNC: 13 U/L (ref 5–45)
BASOPHILS # BLD AUTO: 0.07 THOUSANDS/ΜL (ref 0–0.1)
BASOPHILS NFR BLD AUTO: 1 % (ref 0–1)
BILIRUB SERPL-MCNC: 0.18 MG/DL (ref 0.2–1)
BILIRUB UR QL STRIP: NEGATIVE
BLD GP AB SCN SERPL QL: NEGATIVE
BUN SERPL-MCNC: 9 MG/DL (ref 5–25)
CALCIUM SERPL-MCNC: 8.8 MG/DL (ref 8.3–10.1)
CHLORIDE SERPL-SCNC: 107 MMOL/L (ref 100–108)
CLARITY UR: CLEAR
CO2 SERPL-SCNC: 28 MMOL/L (ref 21–32)
COLOR UR: YELLOW
CREAT SERPL-MCNC: 0.63 MG/DL (ref 0.6–1.3)
EOSINOPHIL # BLD AUTO: 0.06 THOUSAND/ΜL (ref 0–0.61)
EOSINOPHIL NFR BLD AUTO: 1 % (ref 0–6)
ERYTHROCYTE [DISTWIDTH] IN BLOOD BY AUTOMATED COUNT: 18.1 % (ref 11.6–15.1)
FERRITIN SERPL-MCNC: 5 NG/ML (ref 8–388)
FOLATE SERPL-MCNC: >20 NG/ML (ref 3.1–17.5)
GFR SERPL CREATININE-BSD FRML MDRD: 118 ML/MIN/1.73SQ M
GLUCOSE SERPL-MCNC: 85 MG/DL (ref 65–140)
GLUCOSE UR STRIP-MCNC: NEGATIVE MG/DL
HBV SURFACE AG SER QL: NORMAL
HCT VFR BLD AUTO: 40.6 % (ref 34.8–46.1)
HGB BLD-MCNC: 12 G/DL (ref 11.5–15.4)
HGB UR QL STRIP.AUTO: NEGATIVE
IMM GRANULOCYTES # BLD AUTO: 0.01 THOUSAND/UL (ref 0–0.2)
IMM GRANULOCYTES NFR BLD AUTO: 0 % (ref 0–2)
IRON SATN MFR SERPL: 30 %
IRON SERPL-MCNC: 149 UG/DL (ref 50–170)
KETONES UR STRIP-MCNC: NEGATIVE MG/DL
LEUKOCYTE ESTERASE UR QL STRIP: NEGATIVE
LYMPHOCYTES # BLD AUTO: 1.49 THOUSANDS/ΜL (ref 0.6–4.47)
LYMPHOCYTES NFR BLD AUTO: 26 % (ref 14–44)
MCH RBC QN AUTO: 23.6 PG (ref 26.8–34.3)
MCHC RBC AUTO-ENTMCNC: 29.6 G/DL (ref 31.4–37.4)
MCV RBC AUTO: 80 FL (ref 82–98)
MONOCYTES # BLD AUTO: 0.58 THOUSAND/ΜL (ref 0.17–1.22)
MONOCYTES NFR BLD AUTO: 10 % (ref 4–12)
NEUTROPHILS # BLD AUTO: 3.58 THOUSANDS/ΜL (ref 1.85–7.62)
NEUTS SEG NFR BLD AUTO: 62 % (ref 43–75)
NITRITE UR QL STRIP: NEGATIVE
NRBC BLD AUTO-RTO: 0 /100 WBCS
PH UR STRIP.AUTO: 6.5 [PH]
PLATELET # BLD AUTO: 283 THOUSANDS/UL (ref 149–390)
PMV BLD AUTO: 12.5 FL (ref 8.9–12.7)
POTASSIUM SERPL-SCNC: 3.9 MMOL/L (ref 3.5–5.3)
PROT SERPL-MCNC: 8.1 G/DL (ref 6.4–8.2)
PROT UR STRIP-MCNC: NEGATIVE MG/DL
RBC # BLD AUTO: 5.08 MILLION/UL (ref 3.81–5.12)
RH BLD: POSITIVE
RUBV IGG SERPL IA-ACNC: 48 IU/ML
SODIUM SERPL-SCNC: 140 MMOL/L (ref 136–145)
SP GR UR STRIP.AUTO: 1.01 (ref 1–1.03)
SPECIMEN EXPIRATION DATE: NORMAL
TIBC SERPL-MCNC: 498 UG/DL (ref 250–450)
UROBILINOGEN UR QL STRIP.AUTO: 0.2 E.U./DL
VIT B12 SERPL-MCNC: 306 PG/ML (ref 100–900)
WBC # BLD AUTO: 5.79 THOUSAND/UL (ref 4.31–10.16)

## 2019-12-31 PROCEDURE — 82306 VITAMIN D 25 HYDROXY: CPT

## 2019-12-31 PROCEDURE — 87086 URINE CULTURE/COLONY COUNT: CPT

## 2019-12-31 PROCEDURE — 36415 COLL VENOUS BLD VENIPUNCTURE: CPT

## 2019-12-31 PROCEDURE — 82607 VITAMIN B-12: CPT

## 2019-12-31 PROCEDURE — 83540 ASSAY OF IRON: CPT

## 2019-12-31 PROCEDURE — 80081 OBSTETRIC PANEL INC HIV TSTG: CPT

## 2019-12-31 PROCEDURE — 81003 URINALYSIS AUTO W/O SCOPE: CPT

## 2019-12-31 PROCEDURE — 80053 COMPREHEN METABOLIC PANEL: CPT

## 2019-12-31 PROCEDURE — 82746 ASSAY OF FOLIC ACID SERUM: CPT

## 2019-12-31 PROCEDURE — 82728 ASSAY OF FERRITIN: CPT

## 2019-12-31 PROCEDURE — 83550 IRON BINDING TEST: CPT

## 2019-12-31 PROCEDURE — OBC: Performed by: OBSTETRICS & GYNECOLOGY

## 2019-12-31 RX ORDER — LORATADINE 10 MG/1
10 TABLET ORAL DAILY
COMMUNITY

## 2019-12-31 RX ORDER — FAMOTIDINE 20 MG/1
20 TABLET, FILM COATED ORAL DAILY
COMMUNITY

## 2019-12-31 NOTE — PATIENT INSTRUCTIONS
Pregnancy at 7 to [address]:   What changes are happening to your body:  Pregnancy hormones may cause your body to go through many changes during this stage of your pregnancy  You may feel more tired than usual, and have mood swings, nausea and vomiting, and headaches  Your breasts may feel tender and swollen and you may urinate more frequently  Seek care immediately if:   · You have pain or cramping in your abdomen or low back  · You have heavy vaginal bleeding or clotting  · You pass material that looks like tissue or large clots  Collect the material and bring it with you  Contact your healthcare provider if:   · You have light bleeding  · You have chills or a fever  · You have vaginal itching, burning, or pain  · You have yellow, green, white, or foul-smelling vaginal discharge  · You have pain or burning when you urinate, less urine than usual, or pink or bloody urine  · You have questions or concerns about your condition or care  How to care for yourself at this stage of your pregnancy:   · Manage nausea and vomiting  Avoid fatty and spicy foods  Eat small meals throughout the day instead of large meals  Yuliana may help to decrease nausea  Ask your healthcare provider about other ways of decreasing nausea and vomiting  · Eat a variety of healthy foods  Healthy foods include fruits, vegetables, whole-grain breads, low-fat dairy foods, beans, lean meats, and fish  Drink liquids as directed  Ask how much liquid to drink each day and which liquids are best for you  Limit caffeine to less than 200 milligrams each day  Limit your intake of fish to 2 servings each week  Choose fish low in mercury such as canned light tuna, shrimp, salmon, cod, or tilapia  Do not  eat fish high in mercury such as swordfish, tilefish, yolis mackerel, and shark  · Take prenatal vitamins as directed    Your need for certain vitamins and minerals, such as folic acid, increases during pregnancy  Prenatal vitamins provide some of the extra vitamins and minerals you need  Prenatal vitamins may also help to decrease the risk of certain birth defects  · Ask how much weight you should gain each month  Too much or too little weight gain can be unhealthy for you and your baby  · Do not smoke  If you smoke, it is never too late to quit  Smoking increases your risk of a miscarriage and other health problems during your pregnancy  Smoking can cause your baby to be born too early or weigh less at birth  Ask your healthcare provider for information if you need help quitting  · Do not drink alcohol  Alcohol passes from your body to your baby through the placenta  It can affect your baby's brain development and cause fetal alcohol syndrome (FAS)  FAS is a group of conditions that causes mental, behavior, and growth problems  · Talk to your healthcare provider before you take any medicines  Many medicines may harm your baby if you take them when you are pregnant  Do not take any medicines, vitamins, herbs, or supplements without first talking to your healthcare provider  Never use illegal or street drugs (such as marijuana or cocaine) while you are pregnant  Safety tips during pregnancy:   · Avoid hot tubs and saunas  Do not use a hot tub or sauna while you are pregnant, especially during your first trimester  Hot tubs and saunas may raise your baby's temperature and increase the risk of birth defects  · Avoid toxoplasmosis  This is an infection caused by eating raw meat or being around infected cat feces  It can cause birth defects, miscarriages, and other problems  Wash your hands after you touch raw meat  Make sure any meat is well-cooked before you eat it  Avoid raw eggs and unpasteurized milk  Use gloves or ask someone else to clean your cat's litter box while you are pregnant    Changes that are happening with your baby:  By 10 weeks, your baby will be about 2 ½ inches long from the top of the head to the rump (baby's bottom)  Your baby weighs about ½ ounce  Major body organs, such as the brain, heart, and lungs, are forming  Your baby's facial features are also starting to form  What you need to know about prenatal care:  Prenatal care is a series of visits with your healthcare provider throughout your pregnancy  During the first 28 weeks of your pregnancy, you will see your healthcare provider once a month  Prenatal care can help prevent problems during pregnancy and childbirth  Your healthcare provider will ask questions about your health and any previous pregnancies you have had  He will also ask about any medicines you are taking  You may also need any of the following:  · A pap smear  will be done to check your cervix for abnormal cells  The cervix is the narrow opening at the bottom of your uterus  The cervix meets the top part of the vagina  · A pelvic exam  allows your healthcare provider to see your cervix (the bottom part of your uterus)  Your healthcare provider uses a speculum to gently open your vagina  He will check the size and shape of your uterus  · Blood tests  may be done to check for anemia or blood type  Your healthcare provider may also order other blood tests to check if you are immune to certain diseases such as Hepatitis B  He may also recommend an HIV test     · Urine tests  may also be done to check for signs of infection  · Your blood pressure and weight  will be checked  © 2017 [address] [employer] Information is for End User's use only and may not be sold, redistributed or otherwise used for commercial purposes  All illustrations and images included in CareNotes® are the copyrighted property of A D A M , Inc  or Dyllan Ford  The above information is an  only  It is not intended as medical advice for individual conditions or treatments   Talk to your doctor, nurse or pharmacist before following any medical regimen to see if it is safe and effective for you  Pregnancy at 11 to 14 Weeks   AMBULATORY CARE:   What changes are happening to your body: You are now at the end of your first trimester and entering your second trimester  Morning sickness usually goes away by this time  You may have other symptoms such as fatigue, frequent urination, and headaches  You may have gained between 2 to 4 pounds by now  Seek care immediately if:   · You have pain or cramping in your abdomen or low back  · You have heavy vaginal bleeding or clotting  · You pass material that looks like tissue or large clots  Collect the material and bring it with you  Contact your healthcare provider if:   · You cannot keep food or drinks down, and you are losing weight  · You have light bleeding  · You have chills or a fever  · You have vaginal itching, burning, or pain  · You have yellow, green, white, or foul-smelling vaginal discharge  · You have pain or burning when you urinate, less urine than usual, or pink or bloody urine  · You have questions or concerns about your condition or care  How to care for yourself at this stage of your pregnancy:   · Get plenty of rest   You may feel more tired than normal  You may need to take naps or go to bed earlier  · Manage nausea and vomiting  Avoid fatty and spicy foods  Eat small meals throughout the day instead of large meals  Yuliaan may help to decrease nausea  Ask your healthcare provider about other ways of decreasing nausea and vomiting  · Eat a variety of healthy foods  Healthy foods include fruits, vegetables, whole-grain breads, low-fat dairy foods, beans, lean meats, and fish  Drink liquids as directed  Ask how much liquid to drink each day and which liquids are best for you  Limit caffeine to less than 200 milligrams each day  Limit your intake of fish to 2 servings each week  Choose fish low in mercury such as canned light tuna, shrimp, salmon, cod, or tilapia   Do not  eat fish high in mercury such as swordfish, tilefish, yolis mackerel, and shark  · Take prenatal vitamins as directed  Your need for certain vitamins and minerals, such as folic acid, increases during pregnancy  Prenatal vitamins provide some of the extra vitamins and minerals you need  Prenatal vitamins may also help to decrease the risk of certain birth defects  · Do not smoke  If you smoke, it is never too late to quit  Smoking increases your risk of a miscarriage and other health problems during your pregnancy  Smoking can cause your baby to be born too early or weigh less at birth  Ask your healthcare provider for information if you need help quitting  · Do not drink alcohol  Alcohol passes from your body to your baby through the placenta  It can affect your baby's brain development and cause fetal alcohol syndrome (FAS)  FAS is a group of conditions that causes mental, behavior, and growth problems  · Talk to your healthcare provider before you take any medicines  Many medicines may harm your baby if you take them when you are pregnant  Do not take any medicines, vitamins, herbs, or supplements without first talking to your healthcare provider  Never use illegal or street drugs (such as marijuana or cocaine) while you are pregnant  Safety tips during pregnancy:   · Avoid hot tubs and saunas  Do not use a hot tub or sauna while you are pregnant, especially during your first trimester  Hot tubs and saunas may raise your baby's temperature and increase the risk of birth defects  · Avoid toxoplasmosis  This is an infection caused by eating raw meat or being around infected cat feces  It can cause birth defects, miscarriages, and other problems  Wash your hands after you touch raw meat  Make sure any meat is well-cooked before you eat it  Avoid raw eggs and unpasteurized milk  Use gloves or ask someone else to clean your cat's litter box while you are pregnant    Changes that are happening with your baby: Your baby has fully formed fingernails and toenails  Your baby's heartbeat can now be heard  Ask your healthcare provider if you can listen to your baby's heartbeat  By week 14, your baby is over 4 inches long from the top of the head to the rump (baby's bottom)  Your baby weighs over 3 ounces  What you need to know about prenatal care:  During the first 28 weeks of your pregnancy, you will see your healthcare provider once a month  Prenatal care can help prevent problems during pregnancy and childbirth  Your healthcare provider will check your blood pressure and weight  You may also need any of the following:  · A urine test  may also be done to check for sugar and protein  These can be signs of gestational diabetes or infection  · Genetic disorders screening tests  may be offered to you  This screening test checks your baby's risk of genetic disorders such as Down syndrome  The screening test includes a blood test and ultrasound  · Your baby's heart rate  will be checked  © 2017 [address]  Information is for End User's use only and may not be sold, redistributed or otherwise used for commercial purposes  All illustrations and images included in CareNotes® are the copyrighted property of A D A M , Inc  or Dyllan Ford  The above information is an  only  It is not intended as medical advice for individual conditions or treatments  Talk to your doctor, nurse or pharmacist before following any medical regimen to see if it is safe and effective for you

## 2019-12-31 NOTE — PROGRESS NOTES
OB INTAKE INTERVIEW      Pt presents for OB intake    OB History    Para Term  AB Living   2       1     SAB TAB Ectopic Multiple Live Births   1              # Outcome Date GA Lbr Lucian/2nd Weight Sex Delivery Anes PTL Lv   2 Current            1 2016 9w0d    SAB            Hx of  delivery prior to 36 weeks 6 days: NO   Last Menstrual Period:   Patient's last menstrual period was 10/26/2019 (exact date)  Ultrasound date:   19 6 weeks 3 days  Estimated date of delivery:   Estimated Date of Delivery: 20  confirmed by [address]  ? History of Diabetes: NO  History of Hypertension: NO      Infection Screening: Does the pt have a hx of MRSA? NO    H&P visit scheduled  20    ? Interview education  Information on [facility] Pregnancy Essentials reviewed  Handouts given: Baby and Me phone pat guide  Baby and Me support center  [facility] in Pregnancy information sheet   [facility]  Discussed genetic testing-    - pt interested desires seq  Screen  Information on CF and SMA carrier screening given  Patient will check with insurance and let office know at next appointment if testing desired  Discussed Tdap vaccine  Had influenza vaccine for this season         Depression Screening Follow-up Plan: Patient's depression screening was NEGATIVE  with an [place] score of  7                  The patient was oriented to our practice and all questions were answered    Interviewed by: Serge Doll RN 19

## 2020-01-01 LAB
BACTERIA UR CULT: NORMAL
HIV 1+2 AB+HIV1 P24 AG SERPL QL IA: NORMAL

## 2020-01-02 LAB — RPR SER QL: NORMAL

## 2020-01-03 ENCOUNTER — TELEPHONE (OUTPATIENT)
Dept: OBGYN CLINIC | Facility: MEDICAL CENTER | Age: 35
End: 2020-01-03

## 2020-01-03 DIAGNOSIS — E55.9 VITAMIN D DEFICIENCY: Primary | ICD-10-CM

## 2020-01-03 RX ORDER — ERGOCALCIFEROL 1.25 MG/1
50000 CAPSULE ORAL WEEKLY
Qty: 6 CAPSULE | Refills: 0 | Status: SHIPPED | OUTPATIENT
Start: 2020-01-03

## 2020-01-04 ENCOUNTER — HOSPITAL ENCOUNTER (EMERGENCY)
Facility: HOSPITAL | Age: 35
Discharge: HOME | End: 2020-01-05
Attending: EMERGENCY MEDICINE | Admitting: EMERGENCY MEDICINE
Payer: COMMERCIAL

## 2020-01-04 ENCOUNTER — TELEPHONE (OUTPATIENT)
Dept: OTHER | Facility: OTHER | Age: 35
End: 2020-01-04

## 2020-01-04 DIAGNOSIS — O46.90 VAGINAL BLEEDING DURING PREGNANCY: Primary | ICD-10-CM

## 2020-01-04 DIAGNOSIS — O20.0 THREATENED MISCARRIAGE: ICD-10-CM

## 2020-01-04 LAB
B-HCG SERPL-ACNC: 8163.9 MIU/ML
BASOPHILS # BLD AUTO: 0.04 THOUSANDS/ΜL (ref 0–0.1)
BASOPHILS NFR BLD AUTO: 1 % (ref 0–1)
EOSINOPHIL # BLD AUTO: 0.07 THOUSAND/ΜL (ref 0–0.61)
EOSINOPHIL NFR BLD AUTO: 1 % (ref 0–6)
ERYTHROCYTE [DISTWIDTH] IN BLOOD BY AUTOMATED COUNT: 17.3 % (ref 11.6–15.1)
HCT VFR BLD AUTO: 33.9 % (ref 34.8–46.1)
HGB BLD-MCNC: 10.2 G/DL (ref 11.5–15.4)
IMM GRANULOCYTES # BLD AUTO: 0.02 THOUSAND/UL (ref 0–0.2)
IMM GRANULOCYTES NFR BLD AUTO: 0 % (ref 0–2)
LYMPHOCYTES # BLD AUTO: 1.86 THOUSANDS/ΜL (ref 0.6–4.47)
LYMPHOCYTES NFR BLD AUTO: 31 % (ref 14–44)
MCH RBC QN AUTO: 23.9 PG (ref 26.8–34.3)
MCHC RBC AUTO-ENTMCNC: 30.1 G/DL (ref 31.4–37.4)
MCV RBC AUTO: 79 FL (ref 82–98)
MONOCYTES # BLD AUTO: 0.63 THOUSAND/ΜL (ref 0.17–1.22)
MONOCYTES NFR BLD AUTO: 10 % (ref 4–12)
NEUTROPHILS # BLD AUTO: 3.45 THOUSANDS/ΜL (ref 1.85–7.62)
NEUTS SEG NFR BLD AUTO: 57 % (ref 43–75)
NRBC BLD AUTO-RTO: 0 /100 WBCS
PLATELET # BLD AUTO: 226 THOUSANDS/UL (ref 149–390)
PMV BLD AUTO: 11.1 FL (ref 8.9–12.7)
RBC # BLD AUTO: 4.27 MILLION/UL (ref 3.81–5.12)
WBC # BLD AUTO: 6.07 THOUSAND/UL (ref 4.31–10.16)

## 2020-01-04 PROCEDURE — 84702 CHORIONIC GONADOTROPIN TEST: CPT | Performed by: EMERGENCY MEDICINE

## 2020-01-04 PROCEDURE — 99285 EMERGENCY DEPT VISIT HI MDM: CPT | Performed by: EMERGENCY MEDICINE

## 2020-01-04 PROCEDURE — 99284 EMERGENCY DEPT VISIT MOD MDM: CPT

## 2020-01-04 PROCEDURE — 85025 COMPLETE CBC W/AUTO DIFF WBC: CPT | Performed by: EMERGENCY MEDICINE

## 2020-01-04 PROCEDURE — 36415 COLL VENOUS BLD VENIPUNCTURE: CPT | Performed by: EMERGENCY MEDICINE

## 2020-01-04 NOTE — TELEPHONE ENCOUNTER
Kyra Burger 1985  CONFIDENTIALTY NOTICE: This fax transmission is intended only for the addressee  It contains information that is legally privileged,  confidential or otherwise protected from use or disclosure  If you are not the intended recipient, you are strictly prohibited from reviewing,  disclosing, copying using or disseminating any of this information or taking any action in reliance on or regarding this information  If you have  received this fax in error, please notify us immediately by telephone so that we can arrange for its return to us  Page:  3  Call Id: [id number]  Health Call  Standard Call Report  Health Call  Patient Name: Kyra Burger  Gender: Female  : 1985  Age: 36 Y 2 M 9 D  Return Phone  Number: [phone] (Home)  Address:  [facility]/Zip: [address] [place] [address]  Practice Name: [facility]  Practice Charged:  Physician:  0 [facility] Name:  Relationship To  Patient: Self  Return Phone Number: [phone] (Home)  Presenting Problem: "I am 8 weeks and 6 days pregnant  I  am experiencing cramping and vaginal  bleeding "  Service Type: Triage  Charged Service 1: N/A  Pharmacy Name and  Number:  Nurse Assessment  Nurse: Carolyn Manzo Date/Time: 2020 2:00:07 PM  Type of assessment required:  ---General (Adult or Child)  Duration of Current S/S  ---Today this morning  Location/Radiation  ---Vaginal  Temperature (F) and route:  ---Denies fever  Symptom Specific Meds (Dose/Time):  ---None  Other S/S  ---Patient how is 8 wks and 6 days pregnant  Started with vaginal bleeding that was  more like mild spotting three days ago  This morning vaginal bleeding has increased  and noticed a dime sized blood clot when wiping  Mild belly cramping    Pain Scale on scale of 1-10, 10 being the worst:  ---3/10- belly cramping  Symptom progression:  ---worse  Intake and Output  Kyra Burger 1985  CONFIDENTIALTY NOTICE: This fax transmission is intended only for the addressee  It contains information that is legally privileged,  confidential or otherwise protected from use or disclosure  If you are not the intended recipient, you are strictly prohibited from reviewing,  disclosing, copying using or disseminating any of this information or taking any action in reliance on or regarding this information  If you have  received this fax in error, please notify us immediately by telephone so that we can arrange for its return to us  Page: 2 of 3  Call Id: [id number]  Nurse Assessment  ---WNL  LMP/ Pregnancy:  ---8 weeks pregnant  Breastfeeding  ---No  Last Exam/Treatment:  ---December 31st in the office for first prenatal visit  Protocols  Protocol Title Nurse Date/Time  Pregnancy - Vaginal Bleeding Less Than 20  Kristel Arellano RN, Tiffanie Lewis 1/4/2020 2:06:11 PM  Question Caller Affirmed  Disp  Time Disposition Final User  1/4/2020 1:33:19 PM Send to Follow Up Adilson Posey RN, [place]  1/4/2020 2:25:10 PM Call PCP Now Ava Hogan RN, [place]  1/4/2020 2:34:43 PM RN Triaged Yes Ava Hogan RN, [place]  Disposition Overriden: See PCP When Office is Open (within 3 days)  Override Reason: Specify reason  (Please document in 'advice recommended' section)  Care Advice Given Per Protocol  SEE PCP WITHIN 3 DAYS: * You need to be seen within 2 or 3 days  Call your doctor during regular office hours and make an  appointment  An urgent care center is often the best source of care if your doctor's office is closed or you can't get an appointment  NOTE: If office will be open tomorrow, tell caller to call then, not in 3 days  GENERAL RECOMMENDATIONS: * No intercourse  until next seen by your PCP  * Avoid heavy lifting or strenuous labor  * No douching  * Do not use tampons  Use a vaginal pad (sanitary  pad)   CALL BACK IF: * Severe abdominal pain or constant pain lasting over 1 hour * You pass any blood clots or if vaginal bleeding  worsens * Pass any tissue * You become worse CARE ADVICE given per Pregnancy - Vaginal Bleeding Less Than 20 Weeks EGA  (Adult) guideline  Caller Understands: Yes  Caller Disagree/Comply: Comply  PreDisposition: Unsure  Comments  User: Jagdeep Haley RN Date/Time: 1/4/2020 1:33:12 PM  Called back and no answer  Left VM to call service if still in need of care advice  Will try again in a few minutes  User: Jagdeep Haley RN Date/Time: 1/4/2020 2:34:22 PM  Spoke with Dr Artemio Wood  Patient is nervous since she has past history of miscarriage  Dr Artemio Wood states that there are a number of  reasons for bleeding in early pregnancy- the most concerning being miscarriage  Dr Artemio Wood stated if bleeding is severe she should  go to ED but if bleeding is mild patient can continue to monitor till she can get in for appt on Monday  If patient feels more  comfortable being evaluated at [facility] ED then she has that option as well  Dr Artemio Wood asked me to let her know if  Maryam Woodall 1985  CONFIDENTIALTY NOTICE: This fax transmission is intended only for the addressee  It contains information that is legally privileged,  confidential or otherwise protected from use or disclosure  If you are not the intended recipient, you are strictly prohibited from reviewing,  disclosing, copying using or disseminating any of this information or taking any action in reliance on or regarding this information  If you have  received this fax in error, please notify us immediately by telephone so that we can arrange for its return to us  Page: 3 of 3  Call Id: [id number]  Comments  patient decides to head over to [facility]  Recommendation discussed with patient who did not make a decision right then  and there (stating she wanted to discuss with her  first)  Asked patient to call me back to let me know if she does decided  to go so I can make Dr Artemio Wood aware  Patient called me back shortly afterwards to let me know she will be going to the ED at [facility]

## 2020-01-05 ENCOUNTER — APPOINTMENT (EMERGENCY)
Dept: ULTRASOUND IMAGING | Facility: HOSPITAL | Age: 35
End: 2020-01-05
Payer: COMMERCIAL

## 2020-01-05 VITALS
DIASTOLIC BLOOD PRESSURE: 65 MMHG | OXYGEN SATURATION: 97 % | BODY MASS INDEX: 31.85 KG/M2 | RESPIRATION RATE: 18 BRPM | TEMPERATURE: 98.6 F | HEART RATE: 61 BPM | SYSTOLIC BLOOD PRESSURE: 137 MMHG | WEIGHT: 174.16 LBS

## 2020-01-05 PROCEDURE — 76816 OB US FOLLOW-UP PER FETUS: CPT

## 2020-01-05 PROCEDURE — 99243 OFF/OP CNSLTJ NEW/EST LOW 30: CPT | Performed by: OBSTETRICS & GYNECOLOGY

## 2020-01-05 PROCEDURE — 76817 TRANSVAGINAL US OBSTETRIC: CPT

## 2020-01-05 RX ORDER — MISOPROSTOL 200 UG/1
800 TABLET ORAL ONCE
Status: DISCONTINUED | OUTPATIENT
Start: 2020-01-05 | End: 2020-01-05

## 2020-01-05 RX ORDER — MISOPROSTOL 200 UG/1
800 TABLET ORAL ONCE
Status: COMPLETED | OUTPATIENT
Start: 2020-01-05 | End: 2020-01-05

## 2020-01-05 RX ADMIN — MISOPROSTOL 800 MCG: 200 TABLET ORAL at 03:47
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
## 2020-01-05 NOTE — ED CARE HANDOFF
Emergency Department Sign Out Note        Sign out and transfer of care from Dr Radha Infante  See Separate Emergency Department note  The patient, Umair Amador, was evaluated by the previous provider for vaginal bleeding   Workup Completed:  Labs, OBGYN consulted and saw the pt, U/s ordered    ED Course / Workup Pending (followup): U/s results    3:00 AM  Ultrasound results are back  I reviewed them  I will discuss with OBGYN     3:23 AM  Spoke with OBGYN  They are here to evaluate the patient  4:39 AM  OBGYN started the patient on Cytotec  Patient now ready for discharge  Instructions discussed with her by OBGYN  They will be following up with her in the office  Patient stable without worsening of symptoms or change in vital signs  Will discharge now  Procedures  MDM    Disposition  Final diagnoses:   Vaginal bleeding during pregnancy   Threatened miscarriage     Time reflects when diagnosis was documented in both MDM as applicable and the Disposition within this note     Time User Action Codes Description Comment    1/5/2020  1:23 AM Nela Blake Add [C07 72] Vaginal bleeding during pregnancy     1/5/2020  1:23 AM Nela Blake Add [O20 0] Threatened miscarriage       ED Disposition     ED Disposition Condition Date/Time Comment    Discharge Stable Sun Jan 5, 2020  1:23 AM Umair Amador discharge to home/self care  Follow-up Information     Follow up With Specialties Details Why Contact Info Additional [address] [facility] Obstetrics and Gynecology Schedule an appointment as soon as possible for a visit   [address], [address] [facility] [address]  [facility], [address], [address] [place], [place], [address] [phone]        Patient's Medications   Discharge Prescriptions    No medications on file     No discharge procedures on file         ED Provider  Electronically Signed by     Loli Saavedra DO  01/05/20 9464

## 2020-01-05 NOTE — CONSULTS
CONSULTATION - Gynecology   Viktor Oliver 36 y o  female MRN: [id number]  Unit/Bed#: ED 23 Encounter: [id number]      SUBJECTIVE    Physician/Team requesting consult: Dr Beckie Kanner  Reason for Consult / Principal Problem: MAB    HPI: Viktor Oliver is a 36 y o  Charlynne Baptise at approximately 8w5d by 1st trimester US 19 that documented an IUP with a FHR of 115bpm at 6w3d  She  presents to the ED with vaginal bleeding for the past several days and passage of some clot  On beside US and formal US there is no fetal heart beat detected and no yolk sac visualized (was previously visualized)  This was a welcomed but not intended pregnancy  She is accompanied by her partner and visibly distraught as this is her 2nd early first trimester pregnancy loss  Review of Systems   Constitutional: Negative for fatigue and fever  Respiratory: Negative for cough, shortness of breath and wheezing  Gastrointestinal: Positive for abdominal pain  Negative for diarrhea, nausea and vomiting  Genitourinary: Positive for vaginal bleeding  Negative for flank pain, genital sores, hematuria and vaginal discharge  Musculoskeletal: Negative for back pain         Past Medical History:   Diagnosis Date    Abnormal Pap smear of cervix     Anemia     Anxiety     BV (bacterial vaginosis)     Depression     Fibroid     GERD (gastroesophageal reflux disease)     Miscarriage     SAB x1    Miscarriage     Polycystic ovary syndrome     Trauma     with previous partner    Urinary tract infection     Varicella      Past Surgical History:   Procedure Laterality Date    ABDOMINAL SURGERY      APPENDECTOMY      BARIATRIC SURGERY      gastricsleeve    BREAST SURGERY      BUNIONECTOMY Right     GASTRIC RESTRICTION SURGERY      REDUCTION MAMMAPLASTY      UTERINE FIBROID EMBOLIZATION      WISDOM TOOTH EXTRACTION       OB History    Para Term  AB Living   2       1     SAB TAB Ectopic Multiple Live Births   1 # Outcome Date GA Lbr Lucian/2nd Weight Sex Delivery Anes PTL Lv   2 Current            1 SAB 2016 9w0d    SAB        Family History   Problem Relation Age of Onset    Hypertension Mother     No Known Problems Sister     Lung cancer Maternal Grandfather      Social History   Social History     Substance and Sexual Activity   Alcohol Use No     Social History     Substance and Sexual Activity   Drug Use Never     Social History     Tobacco Use   Smoking Status Never Smoker   Smokeless Tobacco Never Used       Meds/Allergies   No current facility-administered medications for this encounter  Allergies   Allergen Reactions    Latex Facial Swelling       Objective   Vitals:  Vitals:    01/04/20 2111 01/04/20 2323   BP: 149/90 137/70   BP Location: Right arm Right arm   Pulse: 85 70   Resp: 18 18   Temp: 98 6 °F (37 °C)    TempSrc: Oral    SpO2: 98% 98%   Weight: 79 kg (174 lb 2 6 oz)        No intake or output data in the 24 hours ending 01/05/20 0050    Physical Exam   Constitutional: She is oriented to person, place, and time  She appears well-developed and well-nourished  Visibly upset   HENT:   Head: Normocephalic and atraumatic  Cardiovascular: Normal rate  Pulmonary/Chest: Effort normal    Abdominal: Soft  She exhibits no distension  There is no tenderness  There is no guarding  Genitourinary:   Genitourinary Comments: SSE: pinpoint cervix, blood per cervical os, no pooling of blood or hemorrhage   Musculoskeletal: Normal range of motion  Neurological: She is alert and oriented to person, place, and time  Psychiatric: She has a normal mood and affect  Her behavior is normal  Judgment and thought content normal    Vitals reviewed        LAB RESULTS  Admission on 01/04/2020   Component Date Value    WBC 01/04/2020 6 07     RBC 01/04/2020 4 27     Hemoglobin 01/04/2020 10 2*    Hematocrit 01/04/2020 33 9*    MCV 01/04/2020 79*    MCH 01/04/2020 23 9*    MCHC 01/04/2020 30 1*    RDW 01/04/2020 17 3*    MPV 01/04/2020 11 1     Platelets 45/26/2910 226     nRBC 01/04/2020 0     Neutrophils Relative 01/04/2020 57     Immat GRANS % 01/04/2020 0     Lymphocytes Relative 01/04/2020 31     Monocytes Relative 01/04/2020 10     Eosinophils Relative 01/04/2020 1     Basophils Relative 01/04/2020 1     Neutrophils Absolute 01/04/2020 3 45     Immature Grans Absolute 01/04/2020 0 02     Lymphocytes Absolute 01/04/2020 1 86     Monocytes Absolute 01/04/2020 0 63     Eosinophils Absolute 01/04/2020 0 07     Basophils Absolute 01/04/2020 0 04     HCG, Quant 01/04/2020 8,163 9*       IMAGING  I reviewed the TVUS and agree that there is no fetal cardiac activity, this was previously documented at prior visit in December at OB/GYN office 12/18/19    PELVIC ULTRASOUND, COMPLETE     INDICATION: Pregnant, vaginal bleeding, Miscarriage      COMPARISON: Ultrasound the pelvis on December 18, 2019      TECHNIQUE:   Transabdominal pelvic ultrasound was performed in sagittal and transverse planes with a curvilinear transducer  Additional transvaginal imaging was performed to better evaluate the endometrium and ovaries  Imaging included volumetric   sweeps as well as traditional still imaging technique      FINDINGS:     UTERUS:  The uterus measures 10 7 x 5 8 x 7 0 cm  Shadowing calcifications are seen within the uterus likely due to a fibroid  The cervix shows no suspicious abnormality      ENDOMETRIUM:    Intrauterine gestational sac is seen  There is a fetal pole with crown-rump length measuring 6 weeks 2 days without significant interval growth since prior examination  A yolk sac was not seen on today's examination  No heart rate was able to be   measured      OVARIES/ADNEXA:  Right ovary:  2 7 x 1 6 x 1 4 cm  No suspicious right ovarian abnormality  Doppler flow within normal limits      Left ovary:  3 7 x 2 4 x 2 3 cm  No suspicious left ovarian abnormality    Left ovarian corpus luteum  Doppler flow within normal limits      No suspicious adnexal mass or loculated collections  There is no free fluid      IMPRESSION:     Fetal pole with crown-rump length length measuring 6 weeks 2 days without significant interval growth since prior examination  No heart rate was able to be measured  Findings are concerning for pregnancy failure      The study was marked in EPIC for immediate notification  Assessment/Plan     Assessment/Plan  1  Missed AB, non-viable pregnancy   - EGA 8w   - Measuring 6w gestational sac with the absence of FHR cardiac activity, closed cervix   - Dr Valentin Urena present for exam and discussion with patient, cytotec 800mcg vaginal ordered and placed by Dr Valentin Urena   - Reviewed surgical vs medical vs expectant management and patient would like to avoid the OR and return home to pass products, she is aware of bleeding precautions and will follow up with her regular OB/GYN as instructed, all of her questions answered    Code Status: No Order  Advance Directive and Living Will:      Power of :    POLST:      Counseling / Coordination of Care  Total floor / unit time spent today30 minutes  minutes  Greater than 50% of total time was spent with the patient and / or family counseling and / or coordination of care   A description of the counseling / coordination of care: REJI cytotec    Xavier Kenny, DO  PGY-3 OB/GYN   1/5/2020 12:50 AM

## 2020-01-07 ENCOUNTER — TELEPHONE (OUTPATIENT)
Dept: OBGYN CLINIC | Facility: MEDICAL CENTER | Age: 35
End: 2020-01-07

## 2020-01-07 ENCOUNTER — OFFICE VISIT (OUTPATIENT)
Dept: OBGYN CLINIC | Facility: MEDICAL CENTER | Age: 35
End: 2020-01-07
Payer: COMMERCIAL

## 2020-01-07 VITALS — BODY MASS INDEX: 32.19 KG/M2 | SYSTOLIC BLOOD PRESSURE: 110 MMHG | WEIGHT: 176 LBS | DIASTOLIC BLOOD PRESSURE: 80 MMHG

## 2020-01-07 DIAGNOSIS — O03.9 SAB (SPONTANEOUS ABORTION): Primary | ICD-10-CM

## 2020-01-07 DIAGNOSIS — Z78.9 ATTEMPTING TO CONCEIVE: ICD-10-CM

## 2020-01-07 LAB
ALBUMIN SERPL BCP-MCNC: 3.7 G/DL (ref 3.5–5)
ALP SERPL-CCNC: 52 U/L (ref 46–116)
ALT SERPL W P-5'-P-CCNC: 30 U/L (ref 12–78)
ANION GAP SERPL CALCULATED.3IONS-SCNC: 5 MMOL/L (ref 4–13)
AST SERPL W P-5'-P-CCNC: 11 U/L (ref 5–45)
B-HCG SERPL-ACNC: 4686 MIU/ML
BASOPHILS # BLD AUTO: 0.05 THOUSANDS/ΜL (ref 0–0.1)
BASOPHILS NFR BLD AUTO: 1 % (ref 0–1)
BILIRUB SERPL-MCNC: 0.18 MG/DL (ref 0.2–1)
BUN SERPL-MCNC: 9 MG/DL (ref 5–25)
CALCIUM SERPL-MCNC: 8.3 MG/DL (ref 8.3–10.1)
CHLORIDE SERPL-SCNC: 109 MMOL/L (ref 100–108)
CO2 SERPL-SCNC: 28 MMOL/L (ref 21–32)
CREAT SERPL-MCNC: 0.62 MG/DL (ref 0.6–1.3)
EOSINOPHIL # BLD AUTO: 0.09 THOUSAND/ΜL (ref 0–0.61)
EOSINOPHIL NFR BLD AUTO: 1 % (ref 0–6)
ERYTHROCYTE [DISTWIDTH] IN BLOOD BY AUTOMATED COUNT: 17.8 % (ref 11.6–15.1)
GFR SERPL CREATININE-BSD FRML MDRD: 119 ML/MIN/1.73SQ M
GLUCOSE SERPL-MCNC: 94 MG/DL (ref 65–140)
HCT VFR BLD AUTO: 38.2 % (ref 34.8–46.1)
HGB BLD-MCNC: 11.2 G/DL (ref 11.5–15.4)
IMM GRANULOCYTES # BLD AUTO: 0.02 THOUSAND/UL (ref 0–0.2)
IMM GRANULOCYTES NFR BLD AUTO: 0 % (ref 0–2)
LYMPHOCYTES # BLD AUTO: 1.24 THOUSANDS/ΜL (ref 0.6–4.47)
LYMPHOCYTES NFR BLD AUTO: 20 % (ref 14–44)
MCH RBC QN AUTO: 23.9 PG (ref 26.8–34.3)
MCHC RBC AUTO-ENTMCNC: 29.3 G/DL (ref 31.4–37.4)
MCV RBC AUTO: 81 FL (ref 82–98)
MONOCYTES # BLD AUTO: 0.68 THOUSAND/ΜL (ref 0.17–1.22)
MONOCYTES NFR BLD AUTO: 11 % (ref 4–12)
NEUTROPHILS # BLD AUTO: 4.26 THOUSANDS/ΜL (ref 1.85–7.62)
NEUTS SEG NFR BLD AUTO: 67 % (ref 43–75)
NRBC BLD AUTO-RTO: 0 /100 WBCS
PLATELET # BLD AUTO: 264 THOUSANDS/UL (ref 149–390)
PMV BLD AUTO: 12 FL (ref 8.9–12.7)
POTASSIUM SERPL-SCNC: 4.4 MMOL/L (ref 3.5–5.3)
PROT SERPL-MCNC: 7.2 G/DL (ref 6.4–8.2)
RBC # BLD AUTO: 4.69 MILLION/UL (ref 3.81–5.12)
SODIUM SERPL-SCNC: 142 MMOL/L (ref 136–145)
WBC # BLD AUTO: 6.34 THOUSAND/UL (ref 4.31–10.16)

## 2020-01-07 PROCEDURE — 80053 COMPREHEN METABOLIC PANEL: CPT | Performed by: OBSTETRICS & GYNECOLOGY

## 2020-01-07 PROCEDURE — 99214 OFFICE O/P EST MOD 30 MIN: CPT | Performed by: OBSTETRICS & GYNECOLOGY

## 2020-01-07 PROCEDURE — 84702 CHORIONIC GONADOTROPIN TEST: CPT | Performed by: OBSTETRICS & GYNECOLOGY

## 2020-01-07 PROCEDURE — 85025 COMPLETE CBC W/AUTO DIFF WBC: CPT | Performed by: OBSTETRICS & GYNECOLOGY

## 2020-01-07 PROCEDURE — 36415 COLL VENOUS BLD VENIPUNCTURE: CPT | Performed by: OBSTETRICS & GYNECOLOGY

## 2020-01-07 NOTE — PROGRESS NOTES
Assessment:  36 y o  Mario Acuna who presents as a follow up from the ER on 20 for a complaint of vaginal bleeding in early pregnancy  Unclear by hx of pregnancy passed  Plan:  Diagnoses and all orders for this visit:    SAB (spontaneous )  -     US pelvis complete non OB; Future  -     CBC and differential  -     Comprehensive metabolic panel  -     hCG, quantitative  - Will call with results/recommendations    Attempting to conceive  -     Ambulatory referral to Infertility; Future          __________________________________________________________________    Subjective   Demetra Corral is a 36 y o  Mario Acuna who presents as a follow up from the ER on 20 for a complaint of vaginal bleeding in early pregnancy  Patient reports she started bleeding a few days before her presentation  It was initially light overall, so she waited to be evaluated  Bleeding picked up over the course of the day and was similar to a menses by the evening  She passed a few clots, but no significant tissue  She had an ultrasound in the ER, which noted a 6wk gestation without FHT ([address] previously present on prior scan)  She was diagnosed with an SAB and offered options for management, of which she opted for medical management with cytotec  Cytotec was placed prior to discharge, which caused increased pain and cramping, but compared to her last miscarriage she did not pass the same expected amount of tissue/clots  Currently her bleeding is overall light in flow, very dark blood passing  Her cramps remain severe and 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)  Patient has concerns regarding her management  She notes she had hx of SABx1 and when she called for her +home preg test, she was sent for hCG level per protocol  She notes it was followed and then she went for ultrasound, which showed 6w fetus with +FHT   She called a little bit afterwards with cramping, but was told it could be normal  No US or quant was ordered  We discussed that without bleeding, unfortunately these alone wouldn't be diagnostic tests of impending miscarriage and generally watchful waiting is recommended  Shes concerned because this is her 2nd documented miscarriage, and shes unsure if she had one at home either (approx 1yr ago)  She asks about progesterone supplementation, as her sister had issues with low progesterone; however, her sister conceived via ART  We reviewed problems that could cause RPL and the differences in management for spontaneous vs ART conceptions  Reviewed RPL is 3 or more miscarriages, so currently workup not indicated  That being said, if her sister had difficulties conceiving and shes currently 33yo with two miscarriages, a referral for consultation with [facility] is reasonable  She is agreeable to this  The following portions of the patient's history were reviewed and updated as appropriate: allergies, current medications, past medical history, past social history, past surgical history and problem list     Review of Systems  Review of Systems   Constitutional: Positive for fatigue  Negative for fever  Respiratory: Negative for shortness of breath  Cardiovascular: Negative for palpitations  Gastrointestinal: Negative for abdominal distention, abdominal pain, nausea and vomiting  Genitourinary: Positive for pelvic pain and vaginal bleeding  Negative for vaginal discharge and vaginal pain  Neurological: Positive for tremors ("feels shakey"), weakness and light-headedness  Negative for dizziness and headaches  Objective  /80 (BP Location: Right arm, Patient Position: Sitting, Cuff Size: Standard)   Wt 79 8 kg (176 lb)   LMP 10/26/2019 (Exact Date)   BMI 32 19 kg/m²      Physical Exam:  Physical Exam   Constitutional: She is oriented to person, place, and time  She appears well-developed and well-nourished  No distress  HENT:   Head: Normocephalic and atraumatic     Eyes: No scleral icterus  Cardiovascular: Normal rate  Pulmonary/Chest: Effort normal  No accessory muscle usage  No respiratory distress  Abdominal: Soft  She exhibits no distension  There is no tenderness  There is no rebound and no guarding  Genitourinary: There is no rash, tenderness or lesion on the right labia  There is no rash, tenderness or lesion on the left labia  Cervix exhibits no motion tenderness, no discharge (soft appearing, possibly minimally dilated ) and no friability  There is bleeding in the vagina  No erythema or tenderness in the vagina  No signs of injury around the vagina  No vaginal discharge found  Musculoskeletal: She exhibits no edema or tenderness  Neurological: She is alert and oriented to person, place, and time  Skin: Skin is warm and dry  No rash noted  No erythema  Psychiatric: She has a normal mood and affect   Her behavior is normal

## 2020-01-07 NOTE — TELEPHONE ENCOUNTER
----- Message from Jennifer Cunningham MA sent at 12/27/2019  8:35 AM EST -----  Regarding: OB intake   Patient needs to set up OB intake  She called 12/27 complaining of cramping and back pain along with pressure  No bleeding or leaking  shes about 7w5d  She had a previous SAB around this gestational age  I spoke and the providers and I told patient to monitor and call us if theres any changes, she will be traveling back from [facility] so I told her to take it easy over the weekend and to call back if there's any changes  She wanted another US but Dr Leslie Biggs said theres nothing we can do at this point without any bleeding  I told her these symptoms could be normal in early pregnancy  I just wanted you to be aware

## 2020-01-07 NOTE — LETTER
January 7, 2020     Patient: Olu Yung   YOB: 1985   Date of Visit: 1/7/2020       To Whom it May Concern:    Nomi Petty is under my professional care  She was seen in my office on 1/7/2020 and is under my care for an acute medical problem  It is my recommendation that she remain out of work for the next week  Thank you for your understanding during this time  If you have any questions or concerns, please don't hesitate to call           Sincerely,          Ion Washington MD

## 2020-01-09 ENCOUNTER — TELEPHONE (OUTPATIENT)
Dept: OBGYN CLINIC | Facility: MEDICAL CENTER | Age: 35
End: 2020-01-09

## 2020-01-09 ENCOUNTER — PREP FOR PROCEDURE (OUTPATIENT)
Dept: OBGYN CLINIC | Facility: MEDICAL CENTER | Age: 35
End: 2020-01-09

## 2020-01-09 ENCOUNTER — HOSPITAL ENCOUNTER (OUTPATIENT)
Dept: ULTRASOUND IMAGING | Facility: HOSPITAL | Age: 35
Discharge: HOME | End: 2020-01-09
Attending: OBSTETRICS & GYNECOLOGY
Payer: COMMERCIAL

## 2020-01-09 ENCOUNTER — OFFICE VISIT (OUTPATIENT)
Dept: OBGYN CLINIC | Facility: MEDICAL CENTER | Age: 35
End: 2020-01-09

## 2020-01-09 VITALS
HEIGHT: 62 IN | TEMPERATURE: 99.4 F | SYSTOLIC BLOOD PRESSURE: 140 MMHG | BODY MASS INDEX: 32.57 KG/M2 | DIASTOLIC BLOOD PRESSURE: 80 MMHG | WEIGHT: 177 LBS

## 2020-01-09 DIAGNOSIS — O02.1 MISSED ABORTION: Primary | ICD-10-CM

## 2020-01-09 DIAGNOSIS — O03.9 SAB (SPONTANEOUS ABORTION): ICD-10-CM

## 2020-01-09 PROBLEM — O20.9 FIRST TRIMESTER BLEEDING: Status: RESOLVED | Noted: 2017-01-11 | Resolved: 2020-01-09

## 2020-01-09 PROBLEM — A49.1 GBS (GROUP B STREPTOCOCCUS) INFECTION: Status: RESOLVED | Noted: 2017-05-08 | Resolved: 2020-01-09

## 2020-01-09 PROCEDURE — PREOP: Performed by: OBSTETRICS & GYNECOLOGY

## 2020-01-09 PROCEDURE — 76816 OB US FOLLOW-UP PER FETUS: CPT

## 2020-01-09 PROCEDURE — 76817 TRANSVAGINAL US OBSTETRIC: CPT

## 2020-01-09 RX ORDER — SODIUM CHLORIDE 9 MG/ML
125 INJECTION, SOLUTION INTRAVENOUS CONTINUOUS
Status: CANCELLED | OUTPATIENT
Start: 2020-01-10

## 2020-01-09 RX ORDER — OMEPRAZOLE 20 MG/1
20 CAPSULE, DELAYED RELEASE ORAL DAILY
COMMUNITY

## 2020-01-09 NOTE — PROGRESS NOTES
History & Physical - OB/GYN   Dennis Lopez 36 y o  female MRN: [id number]  Unit/Bed#:  Encounter: [id number]      Chief complaint:  preop exam    HPI:  Ms Gentry Abel is a 36 y o  Clement Israelen who presents for preop exam for dilation and evacuation of a 6wk missed   She previously underwent cytotec medical management after diagnosis of her non-viable pregnancy  She cramped and bled briefly, but ultrasound today is notable for retained POC with +GS and FP  She notes her cramping continues to be fairly severe  She denies chest pain, shortness of breath, nausea/vomiting, or heavy vaginal bleeding  Reviewed with the patient the risks of the procedure, including but not limited to bleeding, infection, injury to surrounding structures, uterine perforation, management of perforation via laparoscopy or laparotomy, and incomplete evacuation  Reviewed options for fetal disposition, which she is electing for hospital disposition  Reviewed options for genetic testing  Patient would like genetic testing, as this is her second miscarriage  Reviewed same day surgery, expected recovery course, and follow up  Patient had all questions answered to her satisfaction  Informed consent was obtained with Dr Geneva Regalado, who will be performing her surgery tomorrow           Active Problems:  Patient Active Problem List   Diagnosis    Abnormal cervical Papanicolaou smear    Depressive disorder    DUB (dysfunctional uterine bleeding)    Pap smear of cervix shows high risk HPV present    Missed          PMH:  Past Medical History:   Diagnosis Date    Abnormal Pap smear of cervix     Anemia     Anxiety     BV (bacterial vaginosis)     Depression     Fibroid     GERD (gastroesophageal reflux disease)     Miscarriage     SAB x1    Miscarriage     Polycystic ovary syndrome     Trauma     with previous partner    Urinary tract infection     Varicella        PSH:  Past Surgical History:   Procedure Laterality Date    APPENDECTOMY      BARIATRIC SURGERY      gastric sleeve    BUNIONECTOMY Right     REDUCTION MAMMAPLASTY      UTERINE FIBROID EMBOLIZATION      WISDOM TOOTH EXTRACTION         Social Hx:  Social History     Tobacco Use    Smoking status: Never Smoker    Smokeless tobacco: Never Used   Substance Use Topics    Alcohol use: No    Drug use: Never         OB Hx:  OB History    Para Term  AB Living   2 0 0 0 1 0   SAB TAB Ectopic Multiple Live Births   1 0 0 0 0      # Outcome Date GA Lbr Lucian/2nd Weight Sex Delivery Anes PTL Lv   2 Current            1 SAB  9w0d    SAB          Meds:  Current Outpatient Medications on File Prior to Visit   Medication Sig Dispense Refill    omeprazole (PriLOSEC) 20 mg delayed release capsule Take 20 mg by mouth daily       No current facility-administered medications on file prior to visit  Allergies: Allergies   Allergen Reactions    Latex Facial Swelling         Physical Exam:  /80 (BP Location: Right arm, Patient Position: Sitting, Cuff Size: Standard)   Temp 99 4 °F (37 4 °C)   Ht 5' 2" (1 575 m)   Wt 80 3 kg (177 lb)   LMP 10/26/2019 (Exact Date)   BMI 32 37 kg/m²     Physical Exam   Constitutional: She is oriented to person, place, and time  She appears well-developed and well-nourished  HENT:   Head: Normocephalic and atraumatic  Eyes: No scleral icterus  Cardiovascular: Normal rate, regular rhythm and normal heart sounds  Exam reveals no gallop and no friction rub  No murmur heard  Pulmonary/Chest: Effort normal and breath sounds normal  No respiratory distress  She has no wheezes  She has no rales  Abdominal: She exhibits no distension  There is no tenderness  There is no rebound and no guarding  Neurological: She is alert and oriented to person, place, and time  Skin: Skin is warm and dry  No rash noted  No erythema  No pallor  Psychiatric: She has a normal mood and affect   Her behavior is normal  Assessment:   36 y o   with missed Ab s/p cytotec without passage of pregnancy  Opting for dilation and evacuation  Plan:   1  To OR for surgery as planned  2  NPO from midnight pre-procedure  3  Doxycycline 100mg preop  4  Desires genetic testing and hospital disposition  5  Follow up in 1wk postop for reassessment      Patient consented for surgery by myself and Dr Talia Andrea at this appointment  Procedure will be performed tomorrow by Dr Talia Andrea

## 2020-01-09 NOTE — H&P (VIEW-ONLY)
History & Physical - OB/GYN   Beryle Manas 36 y o  female MRN: [id number]  Unit/Bed#:  Encounter: [id number]      Chief complaint:  preop exam    HPI:  Ms Juan Quiroga is a 36 y o  Thiago Marquez who presents for preop exam for dilation and evacuation of a 6wk missed   She previously underwent cytotec medical management after diagnosis of her non-viable pregnancy  She cramped and bled briefly, but ultrasound today is notable for retained POC with +GS and FP  She notes her cramping continues to be fairly severe  She denies chest pain, shortness of breath, nausea/vomiting, or heavy vaginal bleeding  Reviewed with the patient the risks of the procedure, including but not limited to bleeding, infection, injury to surrounding structures, uterine perforation, management of perforation via laparoscopy or laparotomy, and incomplete evacuation  Reviewed options for fetal disposition, which she is electing for hospital disposition  Reviewed options for genetic testing  Patient would like genetic testing, as this is her second miscarriage  Reviewed same day surgery, expected recovery course, and follow up  Patient had all questions answered to her satisfaction  Informed consent was obtained with Dr Morelia Mendoza, who will be performing her surgery tomorrow           Active Problems:  Patient Active Problem List   Diagnosis    Abnormal cervical Papanicolaou smear    Depressive disorder    DUB (dysfunctional uterine bleeding)    Pap smear of cervix shows high risk HPV present    Missed          PMH:  Past Medical History:   Diagnosis Date    Abnormal Pap smear of cervix     Anemia     Anxiety     BV (bacterial vaginosis)     Depression     Fibroid     GERD (gastroesophageal reflux disease)     Miscarriage     SAB x1    Miscarriage     Polycystic ovary syndrome     Trauma     with previous partner    Urinary tract infection     Varicella        PSH:  Past Surgical History:   Procedure Laterality Date    APPENDECTOMY      BARIATRIC SURGERY      gastric sleeve    BUNIONECTOMY Right     REDUCTION MAMMAPLASTY      UTERINE FIBROID EMBOLIZATION      WISDOM TOOTH EXTRACTION         Social Hx:  Social History     Tobacco Use    Smoking status: Never Smoker    Smokeless tobacco: Never Used   Substance Use Topics    Alcohol use: No    Drug use: Never         OB Hx:  OB History    Para Term  AB Living   2 0 0 0 1 0   SAB TAB Ectopic Multiple Live Births   1 0 0 0 0      # Outcome Date GA Lbr Lucian/2nd Weight Sex Delivery Anes PTL Lv   2 Current            1 SAB  9w0d    SAB          Meds:  Current Outpatient Medications on File Prior to Visit   Medication Sig Dispense Refill    omeprazole (PriLOSEC) 20 mg delayed release capsule Take 20 mg by mouth daily       No current facility-administered medications on file prior to visit  Allergies: Allergies   Allergen Reactions    Latex Facial Swelling         Physical Exam:  /80 (BP Location: Right arm, Patient Position: Sitting, Cuff Size: Standard)   Temp 99 4 °F (37 4 °C)   Ht 5' 2" (1 575 m)   Wt 80 3 kg (177 lb)   LMP 10/26/2019 (Exact Date)   BMI 32 37 kg/m²     Physical Exam   Constitutional: She is oriented to person, place, and time  She appears well-developed and well-nourished  HENT:   Head: Normocephalic and atraumatic  Eyes: No scleral icterus  Cardiovascular: Normal rate, regular rhythm and normal heart sounds  Exam reveals no gallop and no friction rub  No murmur heard  Pulmonary/Chest: Effort normal and breath sounds normal  No respiratory distress  She has no wheezes  She has no rales  Abdominal: She exhibits no distension  There is no tenderness  There is no rebound and no guarding  Neurological: She is alert and oriented to person, place, and time  Skin: Skin is warm and dry  No rash noted  No erythema  No pallor  Psychiatric: She has a normal mood and affect   Her behavior is normal  Assessment:   36 y o   with missed Ab s/p cytotec without passage of pregnancy  Opting for dilation and evacuation  Plan:   1  To OR for surgery as planned  2  NPO from midnight pre-procedure  3  Doxycycline 100mg preop  4  Desires genetic testing and hospital disposition  5  Follow up in 1wk postop for reassessment      Patient consented for surgery by myself and Dr Andrew Lunsford at this appointment  Procedure will be performed tomorrow by Dr Andrew Lunsford

## 2020-01-09 NOTE — TELEPHONE ENCOUNTER
Call placed to patient to review results  US still notable for retained POC; GS and FP visible, again no FHR noted  CBC/CMP stable and largely normal apart from very mild anemia  This is unlikely the cause of her sx, but the cervical pressure may cause her some light headedness  We reviewed that unfortunately this means she requires further treatment  Discussed repeat cytotec vs D&E, prefers D&E  Will attempt to get scheduled ASAP, as she continues to have severe cramping  Bleeding however remains light       Peg Davis MD  01/09/20  12:49 PM

## 2020-01-09 NOTE — TELEPHONE ENCOUNTER
----- Message from Santos Coley sent at 1/9/2020 12:58 PM EST -----  Regarding: RE: Needs D&E  Pt is scheduled for tomorrow 1/10/20 at 8am with Dr Theodis Seip  Spoke with Pastora in [address] scheduling  Pt is aware  Coming in today for her H&P      ----- Message -----  From: Jerrod Coughlin MD  Sent: 1/9/2020  12:42 PM EST  To: Santos Coley, Abelardo Meehan MD  Subject: Needs D&E                                        Ry Cash has a missed Ab and failed cytotec for medical therapy  Is there any chance we could get her on for D&E tomorrow with [facility]? If this is not a possibility, sometime within the week would be preferable  If we can get her in tomorrow, let me know  I've seen her within the week and I can write her H&P/do orders and we can sign consents first thing when she arrives for surgery (or if she can come in today, I'll sign them with her)

## 2020-01-10 ENCOUNTER — ANESTHESIA EVENT (OUTPATIENT)
Dept: PERIOP | Facility: HOSPITAL | Age: 35
End: 2020-01-10
Payer: COMMERCIAL

## 2020-01-10 ENCOUNTER — HOSPITAL ENCOUNTER (OUTPATIENT)
Facility: HOSPITAL | Age: 35
Setting detail: OUTPATIENT SURGERY
Discharge: HOME | End: 2020-01-10
Attending: OBSTETRICS & GYNECOLOGY | Admitting: OBSTETRICS & GYNECOLOGY
Payer: COMMERCIAL

## 2020-01-10 ENCOUNTER — ANESTHESIA (OUTPATIENT)
Dept: PERIOP | Facility: HOSPITAL | Age: 35
End: 2020-01-10
Payer: COMMERCIAL

## 2020-01-10 VITALS
DIASTOLIC BLOOD PRESSURE: 58 MMHG | TEMPERATURE: 98 F | OXYGEN SATURATION: 99 % | RESPIRATION RATE: 16 BRPM | SYSTOLIC BLOOD PRESSURE: 130 MMHG | HEART RATE: 72 BPM

## 2020-01-10 DIAGNOSIS — O02.1 MISSED ABORTION: ICD-10-CM

## 2020-01-10 PROBLEM — Z98.890 S/P D&C (STATUS POST DILATION AND CURETTAGE): Status: ACTIVE | Noted: 2020-01-10

## 2020-01-10 LAB
ABO GROUP BLD: NORMAL
BLD GP AB SCN SERPL QL: NEGATIVE
ERYTHROCYTE [DISTWIDTH] IN BLOOD BY AUTOMATED COUNT: 17.2 % (ref 11.6–15.1)
HCT VFR BLD AUTO: 31.6 % (ref 34.8–46.1)
HGB BLD-MCNC: 9.7 G/DL (ref 11.5–15.4)
MCH RBC QN AUTO: 24.9 PG (ref 26.8–34.3)
MCHC RBC AUTO-ENTMCNC: 30.7 G/DL (ref 31.4–37.4)
MCV RBC AUTO: 81 FL (ref 82–98)
PLATELET # BLD AUTO: 205 THOUSANDS/UL (ref 149–390)
PMV BLD AUTO: 11.1 FL (ref 8.9–12.7)
RBC # BLD AUTO: 3.9 MILLION/UL (ref 3.81–5.12)
RH BLD: POSITIVE
SPECIMEN EXPIRATION DATE: NORMAL
WBC # BLD AUTO: 3.85 THOUSAND/UL (ref 4.31–10.16)

## 2020-01-10 PROCEDURE — 88305 TISSUE EXAM BY PATHOLOGIST: CPT | Performed by: PATHOLOGY

## 2020-01-10 PROCEDURE — 85027 COMPLETE CBC AUTOMATED: CPT | Performed by: OBSTETRICS & GYNECOLOGY

## 2020-01-10 PROCEDURE — 86900 BLOOD TYPING SEROLOGIC ABO: CPT | Performed by: OBSTETRICS & GYNECOLOGY

## 2020-01-10 PROCEDURE — 86901 BLOOD TYPING SEROLOGIC RH(D): CPT | Performed by: OBSTETRICS & GYNECOLOGY

## 2020-01-10 PROCEDURE — 86850 RBC ANTIBODY SCREEN: CPT | Performed by: OBSTETRICS & GYNECOLOGY

## 2020-01-10 PROCEDURE — 59820 CARE OF MISCARRIAGE: CPT | Performed by: OBSTETRICS & GYNECOLOGY

## 2020-01-10 RX ORDER — EPHEDRINE SULFATE 50 MG/ML
INJECTION INTRAVENOUS AS NEEDED
Status: DISCONTINUED | OUTPATIENT
Start: 2020-01-10 | End: 2020-01-10 | Stop reason: SURG

## 2020-01-10 RX ORDER — FENTANYL CITRATE 50 UG/ML
INJECTION, SOLUTION INTRAMUSCULAR; INTRAVENOUS AS NEEDED
Status: DISCONTINUED | OUTPATIENT
Start: 2020-01-10 | End: 2020-01-10 | Stop reason: SURG

## 2020-01-10 RX ORDER — MEPERIDINE HYDROCHLORIDE 50 MG/ML
12.5 INJECTION INTRAMUSCULAR; INTRAVENOUS; SUBCUTANEOUS ONCE AS NEEDED
Status: DISCONTINUED | OUTPATIENT
Start: 2020-01-10 | End: 2020-01-10 | Stop reason: HOSPADM

## 2020-01-10 RX ORDER — HYDROMORPHONE HCL/PF 1 MG/ML
0.5 SYRINGE (ML) INJECTION
Status: DISCONTINUED | OUTPATIENT
Start: 2020-01-10 | End: 2020-01-10 | Stop reason: HOSPADM

## 2020-01-10 RX ORDER — KETOROLAC TROMETHAMINE 30 MG/ML
INJECTION, SOLUTION INTRAMUSCULAR; INTRAVENOUS AS NEEDED
Status: DISCONTINUED | OUTPATIENT
Start: 2020-01-10 | End: 2020-01-10 | Stop reason: SURG

## 2020-01-10 RX ORDER — ONDANSETRON 2 MG/ML
4 INJECTION INTRAMUSCULAR; INTRAVENOUS ONCE AS NEEDED
Status: DISCONTINUED | OUTPATIENT
Start: 2020-01-10 | End: 2020-01-10 | Stop reason: HOSPADM

## 2020-01-10 RX ORDER — MIDAZOLAM HYDROCHLORIDE 2 MG/2ML
INJECTION, SOLUTION INTRAMUSCULAR; INTRAVENOUS AS NEEDED
Status: DISCONTINUED | OUTPATIENT
Start: 2020-01-10 | End: 2020-01-10 | Stop reason: SURG

## 2020-01-10 RX ORDER — LIDOCAINE HYDROCHLORIDE 10 MG/ML
INJECTION, SOLUTION EPIDURAL; INFILTRATION; INTRACAUDAL; PERINEURAL AS NEEDED
Status: DISCONTINUED | OUTPATIENT
Start: 2020-01-10 | End: 2020-01-10 | Stop reason: SURG

## 2020-01-10 RX ORDER — ACETAMINOPHEN 325 MG/1
975 TABLET ORAL EVERY 6 HOURS PRN
Status: DISCONTINUED | OUTPATIENT
Start: 2020-01-10 | End: 2020-01-10 | Stop reason: HOSPADM

## 2020-01-10 RX ORDER — ONDANSETRON 2 MG/ML
4 INJECTION INTRAMUSCULAR; INTRAVENOUS EVERY 6 HOURS PRN
Status: DISCONTINUED | OUTPATIENT
Start: 2020-01-10 | End: 2020-01-10 | Stop reason: HOSPADM

## 2020-01-10 RX ORDER — METHYLERGONOVINE MALEATE 0.2 MG/ML
INJECTION INTRAVENOUS AS NEEDED
Status: DISCONTINUED | OUTPATIENT
Start: 2020-01-10 | End: 2020-01-10 | Stop reason: SURG

## 2020-01-10 RX ORDER — DOXYCYCLINE HYCLATE 100 MG/1
200 CAPSULE ORAL ONCE
Status: COMPLETED | OUTPATIENT
Start: 2020-01-10 | End: 2020-01-10

## 2020-01-10 RX ORDER — FENTANYL CITRATE/PF 50 MCG/ML
50 SYRINGE (ML) INJECTION
Status: DISCONTINUED | OUTPATIENT
Start: 2020-01-10 | End: 2020-01-10 | Stop reason: HOSPADM

## 2020-01-10 RX ORDER — PROPOFOL 10 MG/ML
INJECTION, EMULSION INTRAVENOUS AS NEEDED
Status: DISCONTINUED | OUTPATIENT
Start: 2020-01-10 | End: 2020-01-10 | Stop reason: SURG

## 2020-01-10 RX ORDER — OXYCODONE HYDROCHLORIDE 5 MG/1
5 TABLET ORAL EVERY 4 HOURS PRN
Status: DISCONTINUED | OUTPATIENT
Start: 2020-01-10 | End: 2020-01-10 | Stop reason: HOSPADM

## 2020-01-10 RX ORDER — MISOPROSTOL 200 UG/1
1000 TABLET ORAL ONCE
Status: COMPLETED | OUTPATIENT
Start: 2020-01-10 | End: 2020-01-10

## 2020-01-10 RX ORDER — SODIUM CHLORIDE 9 MG/ML
125 INJECTION, SOLUTION INTRAVENOUS CONTINUOUS
Status: DISCONTINUED | OUTPATIENT
Start: 2020-01-10 | End: 2020-01-10 | Stop reason: HOSPADM

## 2020-01-10 RX ORDER — ONDANSETRON 2 MG/ML
INJECTION INTRAMUSCULAR; INTRAVENOUS AS NEEDED
Status: DISCONTINUED | OUTPATIENT
Start: 2020-01-10 | End: 2020-01-10 | Stop reason: SURG

## 2020-01-10 RX ORDER — IBUPROFEN 600 MG/1
600 TABLET ORAL EVERY 6 HOURS PRN
Status: DISCONTINUED | OUTPATIENT
Start: 2020-01-10 | End: 2020-01-10 | Stop reason: HOSPADM

## 2020-01-10 RX ORDER — DEXAMETHASONE SODIUM PHOSPHATE 10 MG/ML
INJECTION, SOLUTION INTRAMUSCULAR; INTRAVENOUS AS NEEDED
Status: DISCONTINUED | OUTPATIENT
Start: 2020-01-10 | End: 2020-01-10 | Stop reason: SURG

## 2020-01-10 RX ADMIN — ONDANSETRON 4 MG: 2 INJECTION INTRAMUSCULAR; INTRAVENOUS at 08:47

## 2020-01-10 RX ADMIN — SODIUM CHLORIDE 125 ML/HR: 0.9 INJECTION, SOLUTION INTRAVENOUS at 07:25

## 2020-01-10 RX ADMIN — KETOROLAC TROMETHAMINE 30 MG: 30 INJECTION, SOLUTION INTRAMUSCULAR at 08:47

## 2020-01-10 RX ADMIN — SODIUM CHLORIDE: 0.9 INJECTION, SOLUTION INTRAVENOUS at 09:06

## 2020-01-10 RX ADMIN — DOXYCYCLINE 100 MG: 100 INJECTION, POWDER, LYOPHILIZED, FOR SOLUTION INTRAVENOUS at 07:51

## 2020-01-10 RX ADMIN — LIDOCAINE HYDROCHLORIDE 50 MG: 10 INJECTION, SOLUTION EPIDURAL; INFILTRATION; INTRACAUDAL; PERINEURAL at 08:28

## 2020-01-10 RX ADMIN — METHYLERGONOVINE MALEATE 0.2 MG: 0.2 INJECTION, SOLUTION INTRAMUSCULAR; INTRAVENOUS at 08:51

## 2020-01-10 RX ADMIN — FENTANYL CITRATE 50 MCG: 50 INJECTION, SOLUTION INTRAMUSCULAR; INTRAVENOUS at 09:28

## 2020-01-10 RX ADMIN — MIDAZOLAM 2 MG: 1 INJECTION INTRAMUSCULAR; INTRAVENOUS at 08:19

## 2020-01-10 RX ADMIN — DEXAMETHASONE SODIUM PHOSPHATE 5 MG: 10 INJECTION, SOLUTION INTRAMUSCULAR; INTRAVENOUS at 08:47

## 2020-01-10 RX ADMIN — EPHEDRINE SULFATE 10 MG: 50 INJECTION, SOLUTION INTRAVENOUS at 08:42

## 2020-01-10 RX ADMIN — FENTANYL CITRATE 100 MCG: 50 INJECTION, SOLUTION INTRAMUSCULAR; INTRAVENOUS at 08:31

## 2020-01-10 RX ADMIN — IBUPROFEN 600 MG: 600 TABLET ORAL at 10:15

## 2020-01-10 RX ADMIN — DOXYCYCLINE HYCLATE 200 MG: 100 CAPSULE ORAL at 10:16

## 2020-01-10 RX ADMIN — PROPOFOL 200 MG: 10 INJECTION, EMULSION INTRAVENOUS at 08:28

## 2020-01-10 NOTE — DISCHARGE INSTRUCTIONS
Dilation and Curettage   WHAT YOU NEED TO KNOW:   Dilation and curettage (D&C) is a procedure to remove tissue from the lining of your uterus  DISCHARGE INSTRUCTIONS:   Call 911 for any of the following:   · You have signs of an allergic reaction, such as hives, trouble breathing, or severe swelling  Seek care immediately if:   · You have heavy vaginal bleeding that soaks 1 pad in 1 hour for 2 hours in a row  · You have a fever higher than 100 4°F (38°C)  · You have abdominal cramps for more than 2 days  · Your pain does not get better, even after treatment  Contact your healthcare provider if:   · You have foul-smelling vaginal discharge  · You do not get your monthly period  · You feel depressed or anxious  · You feel very tired and weak  · You have questions or concerns about your condition or care  Medicines: You may need any of the following:  · Prescription pain medicine  may be given  Do not wait until the pain is severe before you take your medicine  Ask your healthcare provider how to take this medicine safely  · Antibiotics  help fight or prevent a bacterial infection  · Take your medicine as directed  Contact your healthcare provider if you think your medicine is not helping or if you have side effects  Tell him or her if you are allergic to any medicine  Keep a list of the medicines, vitamins, and herbs you take  Include the amounts, and when and why you take them  Bring the list or the pill bottles to follow-up visits  Carry your medicine list with you in case of an emergency  Self-care:   · Use sanitary pads if needed  You may have light bleeding for up to 2 weeks  Do not use tampons  Use sanitary pads instead  This will help prevent a vaginal infection  · Rest as needed  Slowly start to do more each day  Return to your daily activities as directed  · Do not have sex for at least 2 weeks after the procedure  This will help prevent an infection      · Use birth control right after your procedure  Your monthly period should start again in 4 to 8 weeks  During this time, you could still ovulate (release an egg)  Use birth control as directed to prevent pregnancy during this time  Follow up with your healthcare provider as directed:  Write down your questions so you remember to ask them during your visits  © 2017 [employer] Information is for End User's use only and may not be sold, redistributed or otherwise used for commercial purposes  All illustrations and images included in CareNotes® are the copyrighted property of [employer] , [employer]  or [employer]  The above information is an  only  It is not intended as medical advice for individual conditions or treatments  Talk to your doctor, nurse or pharmacist before following any medical regimen to see if it is safe and effective for you

## 2020-01-10 NOTE — ANESTHESIA POSTPROCEDURE EVALUATION
Post-Op Assessment Note    CV Status:  Stable  Pain Score: 0    Pain management: adequate     Mental Status:  Alert and awake   Hydration Status:  Euvolemic and stable   PONV Controlled:  None   Airway Patency:  Patent   Post Op Vitals Reviewed: Yes      Staff: CRNA           /67 (01/10/20 0910)    Temp      Pulse 102 (01/10/20 0910)   Resp 20 (01/10/20 0910)    SpO2 96 % (01/10/20 0910)

## 2020-01-10 NOTE — ANESTHESIA PREPROCEDURE EVALUATION
Review of Systems/Medical History  Patient summary reviewed        Cardiovascular  Negative cardio ROS    Pulmonary  Negative pulmonary ROS Asthma , well controlled/ stable ,        GI/Hepatic  Negative GI/hepatic ROS   GERD well controlled,        Negative  ROS        Endo/Other  Negative endo/other ROS   Obesity    GYN  Negative gynecology ROS          Hematology  Negative hematology ROS Anemia iron deficiency anemia,     Musculoskeletal  Negative musculoskeletal ROS        Neurology  Negative neurology ROS      Psychology   Negative psychology ROS Anxiety, Depression ,              Physical Exam    Airway    Mallampati score: II  TM Distance: >3 FB  Neck ROM: full     Dental   No notable dental hx     Cardiovascular  Comment: Negative ROS, Rhythm: regular, Rate: normal, Cardiovascular exam normal    Pulmonary  Pulmonary exam normal Breath sounds clear to auscultation,     Other Findings        Anesthesia Plan  ASA Score- 2     Anesthesia Type- general with ASA Monitors  Additional Monitors:   Airway Plan:     Comment: lma  Or  TIVA   Plan Factors-    Induction- intravenous  Postoperative Plan-     Informed Consent- Anesthetic plan and risks discussed with patient

## 2020-01-10 NOTE — INTERVAL H&P NOTE
H&P reviewed  After examining the patient I find no changes in the patients condition since the H&P had been written    I personally signed consent with patient   Risks of surgery discussed , interested in genetic testing     Vitals:    01/10/20 0655   BP: 116/57   Pulse: (!) 51   Resp: 16   Temp: 98 5 °F (36 9 °C)   SpO2: 100%

## 2020-01-10 NOTE — OP NOTE
OPERATIVE REPORT  PATIENT NAME: Gwendolyn Orosco    :  1985  MRN: [id number]  Pt Location: [facility] OR ROOM 02    SURGERY DATE: 1/10/2020    Surgeon(s) and Role:     * Wagner Bridges MD - Primary     * Kwesi Hartman DO - Assisting    Preop Diagnosis:  Missed  [O02 1]    Post-Op Diagnosis Codes:     * Missed  [O02 1]    Procedure(s) (LRB):  DILATATION AND EVACUATION (D&E) (6 OF WEEKS) (N/A)    Specimen(s):  ID Type Source Tests Collected by Time Destination   1 :  Tissue Products of Conception TISSUE EXAM Wagner Bridges MD 1/10/2020 8012    2 :  Tissue Products of Conception CHROMOSOME ANALYSIS, PRODUCTS OF CONCEPTION Wagner Bridges MD 1/10/2020 5935        Estimated Blood Loss:   500 mL    Drains:  * No LDAs found *    Anesthesia Type:   Choice    Operative Indications:  Missed  [O02 1]    Operative Findings:  Closed cervical os  Intraop TAUS demonstrated MAB w/o fetal heart beat, fetal pole and yolk sac present  Anteverted uterus, multiple fibrioids present, largest posterior noted on TAUS    Complications:   None    Procedure and Technique:  Brief History    All risks, benefits, and alternatives to the procedure were discussed with the patient and she had the opportunity to ask questions  Informed consent was obtained  Description of Procedure    Patient was taken to the operating room were a time out was performed to confirm correct patient and correct procedure  General LMA anesthesia (LMA) was administered and the patient was positioned on the OR table in the dorsal lithotomy position  All pressure points were padded and a dinesh hugger was placed to maintain control of core body temperature  A bimanual exam was performed and the uterus was noted to be anteverted, gestational size of about 7 weeks and with no palpable adnexal masses or fullness  The patient was prepped and draped in the usual sterile fashion      Operative Technique    A straight catheter was introduced into the bladder, which was drained of 200cc of clear yellow urine  A weighted speculum was inserted into the vagina and a Roberts retractor was used to visualize the anterior lip of the cervix, which was then grasped with a single toothed tenaculum  The cervix was serially dilated to 18 [place] using Satinder dilators for introduction of the suction curette  The #8 curved suction curette was introduced into the uterine cavity to the fundus  Suction was applied and curetting was performed by rotating curette 360 degrees as it was withdrawn from the uterus  Suction was released prior to reaching the cervical os  This was done for multiple  passes with tissue obtained on each pass  Following suction curettage, sharp curetting was gently performed starting at the 12'oclock position and rotating a total of 360 degrees to cover all surfaces  Products of conception/endometrial tissue was obtained and sent for pathology  There was noted to be brisk bleeding from the cervical os and anesthesia was notified to administer 0 20mg of IM methergine x1  Bimanual massage was performed  Cytotec 1g rectal was ordered verbally and bleeding substantially improved s/p methergine x1 and continued bimanual massage  We proceeded with administering the rectal cytotec, of note, 2 tablets had fallen onto the floor, only 600mcg were administered rectally for hemorrhage prophylaxis  Vaginal bleeding at this point was very light and on bimanual exam the uterus was diminished in size and firm  The single toothed tenaculum was removed from the anterior lip of the cervix  Good hemostasis was confirmed at the tenaculum puncture sites  Weighted speculum was then removed from the vagina  At the conclusion of the procedure, all needle, sponge, and instrument counts were noted to be correct x2  Patient tolerated the procedure well and was transferred to PACU in stable condition prior to discharge with follow up in 1-2 weeks         Mitzi was present and participated in all key portions of the case      SIGNATURE: Luis Miguel Farfan,   DATE: January 10, 2020  TIME: 9:10 AM

## 2020-01-10 NOTE — PERIOPERATIVE NURSING NOTE
Specimen and combimatrix hand delivered to lab, spoke with Anaid Ly and confirmed all necessary paperwork is completed for the genetic testing

## 2020-01-10 NOTE — PROGRESS NOTES
Note that urine pre                                                                                                                                     gnancy result in enter/edit screen showing today 1/10/2020 is not a result from today

## 2020-01-16 ENCOUNTER — OFFICE VISIT (OUTPATIENT)
Dept: OBGYN CLINIC | Facility: MEDICAL CENTER | Age: 35
End: 2020-01-16
Payer: COMMERCIAL

## 2020-01-16 VITALS — DIASTOLIC BLOOD PRESSURE: 84 MMHG | BODY MASS INDEX: 32.94 KG/M2 | WEIGHT: 180.1 LBS | SYSTOLIC BLOOD PRESSURE: 132 MMHG

## 2020-01-16 DIAGNOSIS — Z98.890 S/P D&C (STATUS POST DILATION AND CURETTAGE): Primary | ICD-10-CM

## 2020-01-16 DIAGNOSIS — O03.9 SAB (SPONTANEOUS ABORTION): ICD-10-CM

## 2020-01-16 PROCEDURE — 84702 CHORIONIC GONADOTROPIN TEST: CPT | Performed by: OBSTETRICS & GYNECOLOGY

## 2020-01-16 PROCEDURE — 36415 COLL VENOUS BLD VENIPUNCTURE: CPT | Performed by: OBSTETRICS & GYNECOLOGY

## 2020-01-16 PROCEDURE — 99024 POSTOP FOLLOW-UP VISIT: CPT | Performed by: OBSTETRICS & GYNECOLOGY

## 2020-01-17 LAB — B-HCG SERPL-ACNC: 47 MIU/ML

## 2020-01-17 NOTE — RESULT ENCOUNTER NOTE
Please inform patient of result of her quant and my recommendation to repeat quant in 2 weeks to follow to non pregnant levels

## 2020-01-20 DIAGNOSIS — O02.1 MISSED ABORTION: Primary | ICD-10-CM

## 2020-01-22 NOTE — RESULT ENCOUNTER NOTE
Please inform patient pathology consistent with products of conception/ still awaiting chromosome analysis results thanks

## 2020-02-05 ENCOUNTER — LAB (OUTPATIENT)
Dept: LAB | Facility: MEDICAL CENTER | Age: 35
End: 2020-02-05
Payer: COMMERCIAL

## 2020-02-05 DIAGNOSIS — O02.1 MISSED ABORTION: ICD-10-CM

## 2020-02-05 LAB — B-HCG SERPL-ACNC: <2 MIU/ML

## 2020-02-05 PROCEDURE — 84702 CHORIONIC GONADOTROPIN TEST: CPT

## 2020-02-05 PROCEDURE — 36415 COLL VENOUS BLD VENIPUNCTURE: CPT

## 2020-03-05 LAB — SCAN RESULT: NORMAL

## 2020-03-12 NOTE — RESULT ENCOUNTER NOTE
Please inform patient chromosomal analysis from products of conception did not show any cell growth therefore no chromosal analysis was able to be obtained

## (undated) DEVICE — GLOVE INDICATOR UNDERGLOVE SZ 6 BLUE

## (undated) DEVICE — D + E SUCTION CANISTER

## (undated) DEVICE — GLOVE SRG LF STRL BGL SKNSNS 5.5 PF

## (undated) DEVICE — SCD SEQUENTIAL COMPRESSION COMFORT SLEEVE MEDIUM KNEE LENGTH: Brand: KENDALL SCD

## (undated) DEVICE — DRAPE EQUIPMENT RF WAND

## (undated) DEVICE — BETHLEHEM UNIVERSAL MINOR VAG: Brand: CARDINAL HEALTH

## (undated) DEVICE — PREMIUM DRY TRAY LF: Brand: MEDLINE INDUSTRIES, INC.

## (undated) DEVICE — D + E SAFE TOUCH TISSUE TRAP (CIRCON)

## (undated) DEVICE — D + E CONNECTION HOSE

## (undated) DEVICE — PVC URETHRAL CATHETER: Brand: DOVER

## (undated) DEVICE — COLLECTION SET, DISPOSABLE WITH HANDLE AND TAPERED FITTINGS TUBING, 6 FT (183 CM): Brand: GYRUS ACMI

## (undated) DEVICE — CURETTE VACURETTE CRVD 8MM